# Patient Record
Sex: FEMALE | Race: WHITE | NOT HISPANIC OR LATINO | Employment: FULL TIME | URBAN - METROPOLITAN AREA
[De-identification: names, ages, dates, MRNs, and addresses within clinical notes are randomized per-mention and may not be internally consistent; named-entity substitution may affect disease eponyms.]

---

## 2018-07-21 ENCOUNTER — HOSPITAL ENCOUNTER (EMERGENCY)
Facility: OTHER | Age: 28
Discharge: HOME | End: 2018-07-22
Attending: EMERGENCY MEDICINE
Payer: MEDICAID

## 2018-07-21 DIAGNOSIS — J06.9 UPPER RESPIRATORY TRACT INFECTION, UNSPECIFIED TYPE: ICD-10-CM

## 2018-07-21 DIAGNOSIS — B34.9 VIRAL SYNDROME: Primary | ICD-10-CM

## 2018-07-21 PROCEDURE — 25000242 PHARM REV CODE 250 ALT 637 W/ HCPCS: Performed by: EMERGENCY MEDICINE

## 2018-07-21 PROCEDURE — 94640 AIRWAY INHALATION TREATMENT: CPT

## 2018-07-21 PROCEDURE — 25000003 PHARM REV CODE 250: Performed by: EMERGENCY MEDICINE

## 2018-07-21 PROCEDURE — 99283 EMERGENCY DEPT VISIT LOW MDM: CPT | Mod: 25

## 2018-07-21 RX ORDER — ACETAMINOPHEN 500 MG
1000 TABLET ORAL
Status: COMPLETED | OUTPATIENT
Start: 2018-07-21 | End: 2018-07-21

## 2018-07-21 RX ORDER — IPRATROPIUM BROMIDE AND ALBUTEROL SULFATE 2.5; .5 MG/3ML; MG/3ML
3 SOLUTION RESPIRATORY (INHALATION)
Status: COMPLETED | OUTPATIENT
Start: 2018-07-21 | End: 2018-07-21

## 2018-07-21 RX ADMIN — IPRATROPIUM BROMIDE AND ALBUTEROL SULFATE 3 ML: .5; 3 SOLUTION RESPIRATORY (INHALATION) at 11:07

## 2018-07-21 RX ADMIN — ACETAMINOPHEN 1000 MG: 500 TABLET, FILM COATED ORAL at 10:07

## 2018-07-22 VITALS
OXYGEN SATURATION: 97 % | BODY MASS INDEX: 38.58 KG/M2 | HEIGHT: 64 IN | DIASTOLIC BLOOD PRESSURE: 55 MMHG | HEART RATE: 96 BPM | RESPIRATION RATE: 20 BRPM | TEMPERATURE: 98 F | WEIGHT: 226 LBS | SYSTOLIC BLOOD PRESSURE: 118 MMHG

## 2018-07-22 RX ORDER — ALBUTEROL SULFATE 90 UG/1
1-2 AEROSOL, METERED RESPIRATORY (INHALATION) EVERY 6 HOURS PRN
Qty: 1 INHALER | Refills: 0 | Status: SHIPPED | OUTPATIENT
Start: 2018-07-22 | End: 2019-07-22

## 2018-07-22 NOTE — ED PROVIDER NOTES
"Encounter Date: 7/21/2018    SCRIBE #1 NOTE: I, Jesus Tarango, am scribing for, and in the presence of, Dr. Bowers .       History     Chief Complaint   Patient presents with    Sore Throat     since last night, difficulty breathing in rt lung "I think it is bruised" cough all day with emesis while coughing 35 weeks pregnant sees [facility]       Time seen by provider: 10:16 PM    This is a 27 y.o. female, 35 weeks gestation, with history of asthma and JAVI, who presents with complaint of sudden sore throat x 2 days. She is also endorsing SOB, cough, nausea, vomiting, mild abdominal pain, and right ear pain. She denies fevers, chills, headaches, dizziness, congestion, rhinorrhea, chest pain, diarrhea, or urinary symptoms. She presented to the ED three months ago with similar symptoms and was reportedly diagnosed with an asthma attack. She was given a breathing treatment and discharged. She follows-up with OB-GYN at [facility] and denies any complications with pregnancy. She admits to prior use of tobacco (last used last week).         The history is provided by the patient.     Review of patient's allergies indicates:   Allergen Reactions    Phenergan [promethazine]      Past Medical History:   Diagnosis Date    JAVI (acute kidney injury) 2009    patient unsure of the cause, but was hospitalized for two weeks.    Asthma      History reviewed. No pertinent surgical history.  Family History   Problem Relation Age of Onset    Kidney failure Mother         unilateral nephrectomy for unclear reason    Kidney failure Maternal Uncle         ESRD, h/o DM2, HTN     Social History   Substance Use Topics    Smoking status: Never Smoker    Smokeless tobacco: Not on file    Alcohol use No     Review of Systems   Constitutional: Positive for appetite change. Negative for chills and fever.   HENT: Positive for ear pain (right) and sore throat. Negative for congestion and rhinorrhea.    Respiratory: Positive for " cough and shortness of breath.    Cardiovascular: Negative for chest pain.   Gastrointestinal: Positive for abdominal pain, nausea and vomiting. Negative for diarrhea.   Genitourinary: Negative for decreased urine volume and dysuria.   Musculoskeletal: Negative for back pain.   Skin: Negative for rash.   Neurological: Negative for dizziness and headaches.       Physical Exam     Initial Vitals [07/21/18 2147]   BP Pulse Resp Temp SpO2   112/61 98 16 97.8 °F (36.6 °C) 99 %      MAP       --         Physical Exam    Nursing note and vitals reviewed.  Constitutional: She appears well-developed and well-nourished. No distress.   Coughing on exam.   HENT:   Head: Normocephalic and atraumatic.   Bilateral TM are dull. No bulging or erythema. Tonsils are surgically absent. No posterior pharyngeal swelling or exudates.    Eyes: Conjunctivae and EOM are normal. Pupils are equal, round, and reactive to light.   Neck: Normal range of motion. Neck supple.   No submandibular lymphadenopathy.    Cardiovascular: Normal rate, regular rhythm and normal heart sounds.   Pulmonary/Chest: No respiratory distress. She has wheezes.   Scattered wheezes bilaterally.    Abdominal: Soft. There is no tenderness.   Gravid.   Musculoskeletal: Normal range of motion.   Neurological: She is alert and oriented to person, place, and time. She has normal strength.   Skin: Skin is warm and dry.   Psychiatric: She has a normal mood and affect. Her behavior is normal. Judgment and thought content normal.         ED Course   Procedures  Labs Reviewed - No data to display       Imaging Results    None          Medical Decision Making:   ED Management:  Well-appearing pregnant patient in her 3rd trimester presents with symptoms of a viral URI.  Cough cold congestion earache sore throat. Her primary complaint is her wheezing and cough.  I have discussed cough medicines which she prefers to avoid anything that might be harmful pregnancy.  I do recommend  honey instead.  She does have scattered wheezes. She is a smoker.  She has been treated with albuterol before.  She improved after breathing treatments here.  We discussed x-ray, but with only 1 day of symptoms, and currently afebrile, I would prefer to defer and she is in agreement.  If she worsens she will return at that point we will obtain x-ray.  At this point will treat as a viral URI.  On physical exam her throat there is no signs of tonsillitis or pharyngitis.  No indication for a rapid strep especially with the constellation of other viral type syndrome.  Counseled to follow up closely with primary care and OB gyn and return here if worse.    I did have an extensive talk regarding signs to return for and need for follow up. Patient expressed understanding and will monitor symptoms closely and follow-up as needed.    MAINE Bowers M.D.  07/22/2018  12:28 AM              Scribe Attestation:   Scribe #1: I performed the above scribed service and the documentation accurately describes the services I performed. I attest to the accuracy of the note.    Attending Attestation:           Physician Attestation for Scribe:  Physician Attestation Statement for Scribe #1: I, Dr. Bowers, reviewed documentation, as scribed by Jesus Tarango  in my presence, and it is both accurate and complete.                    Clinical Impression:     1. Viral syndrome    2. Upper respiratory tract infection, unspecified type                                   Rob Bowers MD  07/22/18 0028

## 2018-07-22 NOTE — ED NOTES
Pt states that she feels better after breathing tx. Pt sitting up in bed, respirations even/unlabored. NAD noted. Updated pt on POC. Side rails up x2, call bell within reach. Will continue to monitor.

## 2018-07-22 NOTE — ED TRIAGE NOTES
"Pt arrived to ed w/ c/o worsening cough, sore throat, n/v. Pt states "I have had this cough since 8 am, my right lung hurts way worse, and I haven't been able to keep anything down including water." Pt reports that she is 35 weeks pregnant and is experiencing some abdominal tightness d/t the coughing. Pt also c/o cp to the right side of her chest, sob, HA and dizziness, states that she took tylenol 3 hours ago. Pt denies any fever, chills, diarrhea and dysuria. No acute distress noted upon assessment.  "

## 2022-08-06 ENCOUNTER — OFFICE VISIT (OUTPATIENT)
Dept: URGENT CARE | Facility: CLINIC | Age: 32
End: 2022-08-06
Payer: MEDICAID

## 2022-08-06 VITALS
HEIGHT: 64 IN | OXYGEN SATURATION: 98 % | HEART RATE: 77 BPM | TEMPERATURE: 98 F | RESPIRATION RATE: 18 BRPM | SYSTOLIC BLOOD PRESSURE: 129 MMHG | BODY MASS INDEX: 38.58 KG/M2 | WEIGHT: 226 LBS | DIASTOLIC BLOOD PRESSURE: 77 MMHG

## 2022-08-06 DIAGNOSIS — S99.922A INJURY OF LEFT FOOT, INITIAL ENCOUNTER: Primary | ICD-10-CM

## 2022-08-06 DIAGNOSIS — S93.402A SPRAIN OF LEFT ANKLE, UNSPECIFIED LIGAMENT, INITIAL ENCOUNTER: ICD-10-CM

## 2022-08-06 PROCEDURE — 3008F BODY MASS INDEX DOCD: CPT | Mod: CPTII,S$GLB,, | Performed by: NURSE PRACTITIONER

## 2022-08-06 PROCEDURE — 99203 OFFICE O/P NEW LOW 30 MIN: CPT | Mod: S$GLB,,, | Performed by: NURSE PRACTITIONER

## 2022-08-06 PROCEDURE — 3074F SYST BP LT 130 MM HG: CPT | Mod: CPTII,S$GLB,, | Performed by: NURSE PRACTITIONER

## 2022-08-06 PROCEDURE — 73610 XR ANKLE COMPLETE 3 VIEW LEFT: ICD-10-PCS | Mod: LT,S$GLB,, | Performed by: RADIOLOGY

## 2022-08-06 PROCEDURE — 3078F PR MOST RECENT DIASTOLIC BLOOD PRESSURE < 80 MM HG: ICD-10-PCS | Mod: CPTII,S$GLB,, | Performed by: NURSE PRACTITIONER

## 2022-08-06 PROCEDURE — 1159F MED LIST DOCD IN RCRD: CPT | Mod: CPTII,S$GLB,, | Performed by: NURSE PRACTITIONER

## 2022-08-06 PROCEDURE — 1160F RVW MEDS BY RX/DR IN RCRD: CPT | Mod: CPTII,S$GLB,, | Performed by: NURSE PRACTITIONER

## 2022-08-06 PROCEDURE — 99203 PR OFFICE/OUTPT VISIT, NEW, LEVL III, 30-44 MIN: ICD-10-PCS | Mod: S$GLB,,, | Performed by: NURSE PRACTITIONER

## 2022-08-06 PROCEDURE — 73630 X-RAY EXAM OF FOOT: CPT | Mod: LT,S$GLB,, | Performed by: RADIOLOGY

## 2022-08-06 PROCEDURE — 1159F PR MEDICATION LIST DOCUMENTED IN MEDICAL RECORD: ICD-10-PCS | Mod: CPTII,S$GLB,, | Performed by: NURSE PRACTITIONER

## 2022-08-06 PROCEDURE — 3074F PR MOST RECENT SYSTOLIC BLOOD PRESSURE < 130 MM HG: ICD-10-PCS | Mod: CPTII,S$GLB,, | Performed by: NURSE PRACTITIONER

## 2022-08-06 PROCEDURE — 1160F PR REVIEW ALL MEDS BY PRESCRIBER/CLIN PHARMACIST DOCUMENTED: ICD-10-PCS | Mod: CPTII,S$GLB,, | Performed by: NURSE PRACTITIONER

## 2022-08-06 PROCEDURE — 73610 X-RAY EXAM OF ANKLE: CPT | Mod: LT,S$GLB,, | Performed by: RADIOLOGY

## 2022-08-06 PROCEDURE — 73630 XR FOOT COMPLETE 3 VIEW LEFT: ICD-10-PCS | Mod: LT,S$GLB,, | Performed by: RADIOLOGY

## 2022-08-06 PROCEDURE — 3008F PR BODY MASS INDEX (BMI) DOCUMENTED: ICD-10-PCS | Mod: CPTII,S$GLB,, | Performed by: NURSE PRACTITIONER

## 2022-08-06 PROCEDURE — 3078F DIAST BP <80 MM HG: CPT | Mod: CPTII,S$GLB,, | Performed by: NURSE PRACTITIONER

## 2022-08-06 RX ORDER — METHOCARBAMOL 500 MG/1
500 TABLET, FILM COATED ORAL 3 TIMES DAILY
COMMUNITY
Start: 2022-06-20

## 2022-08-06 RX ORDER — IBUPROFEN 600 MG/1
600 TABLET ORAL EVERY 6 HOURS PRN
Qty: 30 TABLET | Refills: 0 | Status: SHIPPED | OUTPATIENT
Start: 2022-08-06 | End: 2022-08-09

## 2022-08-06 NOTE — PROGRESS NOTES
"Subjective:       Patient ID: Margy Tai is a 31 y.o. female.    Vitals:  height is 5' 4" (1.626 m) and weight is 102.5 kg (226 lb). Her temperature is 98 °F (36.7 °C). Her blood pressure is 129/77 and her pulse is 77. Her respiration is 18 and oxygen saturation is 98%.     Chief Complaint: Foot Pain (Left/) and Foot Injury    31-year-old female presents to clinic for evaluation of left ankle and left foot pain x2 days.  Patient reports injury occurred last night, when she was walking down some stairs, lost her balance, and rolled her left ankle.  She reports difficulty bearing weight to left foot.  She states that she initially elevated her foot and applied ice.  She is not taking any medications.  She denies numbness or tingling.  She is awake and alert, answers questions appropriately, has no other complaints, is otherwise healthy on today's visit.    Foot Injury   The incident occurred 12 to 24 hours ago. The incident occurred at home. The injury mechanism was a fall. The pain is present in the left ankle and left foot. The quality of the pain is described as aching and shooting. The pain is at a severity of 7/10. The pain is moderate. The pain has been constant since onset. Associated symptoms include an inability to bear weight. She reports no foreign bodies present. The symptoms are aggravated by movement and weight bearing. She has tried nothing for the symptoms.       Constitution: Negative. Negative for activity change, appetite change, chills, sweating, fatigue and fever.   HENT: Negative.    Neck: neck negative.   Eyes: Negative.    Respiratory: Negative.    Gastrointestinal: Negative.    Musculoskeletal: Positive for pain, joint pain and joint swelling.   Skin: Negative.  Negative for erythema and bruising.   Neurological: Negative.  Negative for dizziness.       Objective:      Physical Exam   Constitutional: She is oriented to person, place, and time. She appears well-developed.  Non-toxic " appearance. She does not appear ill. No distress.   HENT:   Head: Normocephalic and atraumatic. Head is without abrasion, without contusion and without laceration.   Ears:   Right Ear: External ear normal.   Left Ear: External ear normal.   Mouth/Throat: Oropharynx is clear and moist and mucous membranes are normal.   Eyes: Conjunctivae, EOM and lids are normal. Right eye exhibits no discharge. Left eye exhibits no discharge.   Neck: Trachea normal and phonation normal.   Cardiovascular: Normal rate.   Pulmonary/Chest: Effort normal. No respiratory distress.   Abdominal: Normal appearance.   Musculoskeletal: Normal range of motion.         General: Swelling and tenderness present. No deformity or signs of injury. Normal range of motion.      Left ankle: Tenderness. Lateral malleolus tenderness found.      Comments: Mild tenderness with mild swelling noted over left lateral malleolus.  No obvious deformity noted.  No erythema, or ecchymosis present on exam.   Neurological: She is alert and oriented to person, place, and time.   Skin: Skin is warm, dry, intact, not diaphoretic and no rash. Capillary refill takes less than 2 seconds. No abrasion, No burn, No bruising, No erythema and No ecchymosis   Psychiatric: Her speech is normal and behavior is normal. Mood, judgment and thought content normal.   Nursing note and vitals reviewed.  X-Ray Ankle Complete 3 View Left    Result Date: 8/6/2022  EXAMINATION: XR FOOT COMPLETE 3 VIEW LEFT; XR ANKLE COMPLETE 3 VIEW LEFT CLINICAL HISTORY: .  Unspecified injury of left foot, initial encounter TECHNIQUE: AP, lateral and oblique views of the left foot were performed.  Three views of the ankle were also performed. COMPARISON: None FINDINGS: Left ankle: No fracture or dislocation.  No widening of the ankle mortise.  The joint spaces are maintained.  No soft tissue abnormality. Left foot: No fracture or dislocation.  The joint spaces are maintained.  No soft tissue abnormality.      No fracture or dislocation of the left ankle or left foot. Electronically signed by: Yusra Stack Date:    08/06/2022 Time:    14:19    X-Ray Foot Complete 3 view Left    Result Date: 8/6/2022  EXAMINATION: XR FOOT COMPLETE 3 VIEW LEFT; XR ANKLE COMPLETE 3 VIEW LEFT CLINICAL HISTORY: .  Unspecified injury of left foot, initial encounter TECHNIQUE: AP, lateral and oblique views of the left foot were performed.  Three views of the ankle were also performed. COMPARISON: None FINDINGS: Left ankle: No fracture or dislocation.  No widening of the ankle mortise.  The joint spaces are maintained.  No soft tissue abnormality. Left foot: No fracture or dislocation.  The joint spaces are maintained.  No soft tissue abnormality.     No fracture or dislocation of the left ankle or left foot. Electronically signed by: Yusra Stack Date:    08/06/2022 Time:    14:19        Assessment:       1. Injury of left foot, initial encounter    2. Sprain of left ankle, unspecified ligament, initial encounter          Plan:         Injury of left foot, initial encounter  -     X-Ray Foot Complete 3 view Left; Future; Expected date: 08/06/2022  -     X-Ray Ankle Complete 3 View Left; Future; Expected date: 08/06/2022    Sprain of left ankle, unspecified ligament, initial encounter  -     ibuprofen (ADVIL,MOTRIN) 600 MG tablet; Take 1 tablet (600 mg total) by mouth every 6 (six) hours as needed for Pain.  Dispense: 30 tablet; Refill: 0    - discussed x-rays of left foot and left ankle, no acute fracture dislocation seen.  Discussed symptomatic care for left ankle sprain.  Ace wrap applied in clinic.  Follow-up with PCP, return to clinic as needed.  Patient verbalized understanding and is in agreement with plan.    Patient Instructions   - You must understand that you have received an Urgent Care treatment only and that you may be released before all of your medical problems are known or treated.   - You, the patient, will arrange for  follow up care as instructed.   - If your condition worsens or fails to improve we recommend that you receive another evaluation at the ER immediately or contact your PCP to discuss your concerns or return here.

## 2022-08-06 NOTE — LETTER
August 6, 2022      [facility]  [address] [facility], [address]  [place] [address]  Phone: [phone]  Fax: [phone]       Patient: Margy Tai   YOB: 1990  Date of Visit: 08/06/2022    To Whom It May Concern:    Susan Tai  was at [facility] on 08/06/2022. The patient may return to work/school on 8/8/2022. If you have any questions or concerns, or if I can be of further assistance, please do not hesitate to contact me.    Sincerely,    Reagan Blackburn NP

## 2022-11-22 ENCOUNTER — OFFICE VISIT (OUTPATIENT)
Dept: URGENT CARE | Facility: CLINIC | Age: 32
End: 2022-11-22
Payer: MEDICAID

## 2022-11-22 VITALS
SYSTOLIC BLOOD PRESSURE: 126 MMHG | DIASTOLIC BLOOD PRESSURE: 74 MMHG | OXYGEN SATURATION: 96 % | HEIGHT: 64 IN | BODY MASS INDEX: 38.58 KG/M2 | RESPIRATION RATE: 16 BRPM | HEART RATE: 60 BPM | TEMPERATURE: 98 F | WEIGHT: 226 LBS

## 2022-11-22 DIAGNOSIS — U07.1 LAB TEST POSITIVE FOR DETECTION OF COVID-19 VIRUS: ICD-10-CM

## 2022-11-22 DIAGNOSIS — J06.9 URI, ACUTE: ICD-10-CM

## 2022-11-22 DIAGNOSIS — R05.9 COUGH IN ADULT: ICD-10-CM

## 2022-11-22 DIAGNOSIS — Z11.52 ENCOUNTER FOR SCREENING LABORATORY TESTING FOR COVID-19 VIRUS: Primary | ICD-10-CM

## 2022-11-22 LAB
CTP QC/QA: YES
SARS-COV-2 AG RESP QL IA.RAPID: NEGATIVE

## 2022-11-22 PROCEDURE — 3008F PR BODY MASS INDEX (BMI) DOCUMENTED: ICD-10-PCS | Mod: CPTII,S$GLB,, | Performed by: FAMILY MEDICINE

## 2022-11-22 PROCEDURE — 99213 PR OFFICE/OUTPT VISIT, EST, LEVL III, 20-29 MIN: ICD-10-PCS | Mod: S$GLB,,, | Performed by: FAMILY MEDICINE

## 2022-11-22 PROCEDURE — 1159F PR MEDICATION LIST DOCUMENTED IN MEDICAL RECORD: ICD-10-PCS | Mod: CPTII,S$GLB,, | Performed by: FAMILY MEDICINE

## 2022-11-22 PROCEDURE — 1160F PR REVIEW ALL MEDS BY PRESCRIBER/CLIN PHARMACIST DOCUMENTED: ICD-10-PCS | Mod: CPTII,S$GLB,, | Performed by: FAMILY MEDICINE

## 2022-11-22 PROCEDURE — 1160F RVW MEDS BY RX/DR IN RCRD: CPT | Mod: CPTII,S$GLB,, | Performed by: FAMILY MEDICINE

## 2022-11-22 PROCEDURE — 3078F PR MOST RECENT DIASTOLIC BLOOD PRESSURE < 80 MM HG: ICD-10-PCS | Mod: CPTII,S$GLB,, | Performed by: FAMILY MEDICINE

## 2022-11-22 PROCEDURE — 3008F BODY MASS INDEX DOCD: CPT | Mod: CPTII,S$GLB,, | Performed by: FAMILY MEDICINE

## 2022-11-22 PROCEDURE — 87811 SARS CORONAVIRUS 2 ANTIGEN POCT, MANUAL READ: ICD-10-PCS | Mod: QW,S$GLB,, | Performed by: FAMILY MEDICINE

## 2022-11-22 PROCEDURE — 3078F DIAST BP <80 MM HG: CPT | Mod: CPTII,S$GLB,, | Performed by: FAMILY MEDICINE

## 2022-11-22 PROCEDURE — 3074F PR MOST RECENT SYSTOLIC BLOOD PRESSURE < 130 MM HG: ICD-10-PCS | Mod: CPTII,S$GLB,, | Performed by: FAMILY MEDICINE

## 2022-11-22 PROCEDURE — 87811 SARS-COV-2 COVID19 W/OPTIC: CPT | Mod: QW,S$GLB,, | Performed by: FAMILY MEDICINE

## 2022-11-22 PROCEDURE — 99213 OFFICE O/P EST LOW 20 MIN: CPT | Mod: S$GLB,,, | Performed by: FAMILY MEDICINE

## 2022-11-22 PROCEDURE — 3074F SYST BP LT 130 MM HG: CPT | Mod: CPTII,S$GLB,, | Performed by: FAMILY MEDICINE

## 2022-11-22 PROCEDURE — 1159F MED LIST DOCD IN RCRD: CPT | Mod: CPTII,S$GLB,, | Performed by: FAMILY MEDICINE

## 2022-11-22 RX ORDER — LORATADINE 10 MG/1
10 TABLET ORAL DAILY
Qty: 30 TABLET | Refills: 2 | Status: SHIPPED | OUTPATIENT
Start: 2022-11-22 | End: 2023-11-22

## 2022-11-22 RX ORDER — IBUPROFEN 600 MG/1
TABLET ORAL
COMMUNITY
Start: 2022-08-25

## 2022-11-22 RX ORDER — BENZONATATE 100 MG/1
200 CAPSULE ORAL 3 TIMES DAILY PRN
Qty: 30 CAPSULE | Refills: 1 | Status: SHIPPED | OUTPATIENT
Start: 2022-11-22

## 2022-11-22 RX ORDER — PROPRANOLOL HYDROCHLORIDE 10 MG/1
TABLET ORAL
COMMUNITY
Start: 2022-08-25

## 2022-11-22 RX ORDER — ALBUTEROL SULFATE 90 UG/1
2 AEROSOL, METERED RESPIRATORY (INHALATION) EVERY 6 HOURS PRN
Qty: 18 G | Refills: 3 | Status: SHIPPED | OUTPATIENT
Start: 2022-11-22

## 2022-11-22 RX ORDER — RISPERIDONE 0.5 MG/1
TABLET ORAL
COMMUNITY
Start: 2022-08-25

## 2022-11-22 RX ORDER — OXCARBAZEPINE 600 MG/1
1200 TABLET, FILM COATED ORAL 2 TIMES DAILY
COMMUNITY
Start: 2022-11-10

## 2022-11-22 NOTE — LETTER
November 22, 2022      [facility]  [address] [place] [address]  Phone: [phone]  Fax: [phone]       Patient: Margy Tai   YOB: 1990  Date of Visit: 11/22/2022    To Whom It May Concern:    Susan Tai  was at [facility] on 11/22/2022. The patient may return to work/school on 11/26/22   with no restrictions. If you have any questions or concerns, or if I can be of further assistance, please do not hesitate to contact me.    Sincerely,    Eva Krueger MD

## 2022-11-22 NOTE — PATIENT INSTRUCTIONS
Please drink plenty of fluids.  Please get plenty of rest.  Please return here or go to the Emergency Department for any concerns or worsening of condition.  If you were prescribed antibiotics, please take them to completion.  If you were given wait & see antibiotics, please wait 5-7 days before taking them, and only take them if your symptoms have worsened or not improved.  If you do begin taking the antibiotics, please take them to completion.  If you were prescribed a narcotic medication, do not drive or operate heavy equipment or machinery while taking these medications.  If you were given a steroid shot in the clinic and have also been given a prescription for a steroid such as Prednisone or a Medrol Dose Pack, please begin taking them tomorrow.  If you do not have Hypertension or any history of palpitations, it is ok to take over the counter Sudafed or Mucinex D or Allegra-D or Claritin-D or Zyrtec-D.  If you do take one of the above, it is ok to combine that with plain over the counter Mucinex or Allegra or Claritin or Zyrtec.  If for example you are taking Zyrtec -D, you can combine that with Mucinex, but not Mucinex-D.  If you are taking Mucinex-D, you can combine that with plain Allegra or Claritin or Zyrtec.   If you do have Hypertension or palpitations, it is safe to take Coricidin HBP for relief of sinus symptoms.  If not allergic, please take over the counter Tylenol (Acetaminophen) and/or Motrin (Ibuprofen) as directed for control of pain and/or fever.  Please follow up with your primary care doctor or specialist as needed.    If you  smoke, please stop smoking.

## 2022-11-22 NOTE — PROGRESS NOTES
"Subjective:       Patient ID: Margy Tai is a 31 y.o. female.    Vitals:  height is 5' 4" (1.626 m) and weight is 102.5 kg (226 lb). Her oral temperature is 97.9 °F (36.6 °C). Her blood pressure is 126/74 and her pulse is 60. Her respiration is 16 and oxygen saturation is 96%.     Chief Complaint: Cough    C/o cough and some chest congestion as well as low grade  fever x 2 days, exposed to 5 y/o daughter with covid  Pt is vaccinated and boosted      Cough  This is a new problem. The current episode started in the past 7 days (2 days ago). The problem has been unchanged. The problem occurs every few minutes. The cough is Non-productive. Associated symptoms include chest pain, ear pain, headaches, postnasal drip, a sore throat and shortness of breath. Pertinent negatives include no ear congestion, fever or nasal congestion. Treatments tried: ibuprofen. The treatment provided mild relief. Her past medical history is significant for asthma and bronchitis. There is no history of pneumonia.     Constitution: Negative for fever.   HENT:  Positive for ear pain, postnasal drip and sore throat.    Cardiovascular:  Positive for chest pain.   Respiratory:  Positive for cough and shortness of breath.    Neurological:  Positive for headaches.     Objective:      Physical Exam   Constitutional: She is oriented to person, place, and time. She appears well-developed. She is cooperative.  Non-toxic appearance. She does not appear ill. No distress.   HENT:   Head: Normocephalic and atraumatic.   Ears:   Right Ear: Hearing, tympanic membrane, external ear and ear canal normal.   Left Ear: Hearing, tympanic membrane, external ear and ear canal normal.   Nose: Nose normal. No mucosal edema, rhinorrhea or nasal deformity. No epistaxis. Right sinus exhibits no maxillary sinus tenderness and no frontal sinus tenderness. Left sinus exhibits no maxillary sinus tenderness and no frontal sinus tenderness.   Mouth/Throat: Uvula is midline, " oropharynx is clear and moist and mucous membranes are normal. Mucous membranes are moist. No trismus in the jaw. Normal dentition. No uvula swelling. No oropharyngeal exudate. Oropharynx is clear.   Eyes: Conjunctivae and lids are normal. Pupils are equal, round, and reactive to light. Right eye exhibits no discharge. Left eye exhibits no discharge. No scleral icterus. Extraocular movement intact   Neck: Trachea normal and phonation normal. Neck supple.   Cardiovascular: Normal rate, regular rhythm, normal heart sounds and normal pulses.   Pulmonary/Chest: Effort normal and breath sounds normal. No respiratory distress.   Abdominal: Normal appearance and bowel sounds are normal. She exhibits no distension and no mass. Soft. There is no abdominal tenderness.   Musculoskeletal: Normal range of motion.         General: No deformity. Normal range of motion.   Neurological: She is alert and oriented to person, place, and time. She exhibits normal muscle tone. Coordination normal.   Skin: Skin is warm, dry, intact, not diaphoretic and not pale.   Psychiatric: Her speech is normal and behavior is normal. Judgment and thought content normal.   Nursing note and vitals reviewed.      Assessment:       1. Encounter for screening laboratory testing for COVID-19 virus    2. Cough in adult    3. URI, acute    4. Lab test positive for detection of COVID-19 virus            Plan:         Encounter for screening laboratory testing for COVID-19 virus  -     SARS Coronavirus 2 Antigen, POCT Manual Read    Cough in adult    URI, acute    Lab test positive for detection of COVID-19 virus    Other orders  -     benzonatate (TESSALON PERLES) 100 MG capsule; Take 2 capsules (200 mg total) by mouth 3 (three) times daily as needed for Cough.  Dispense: 30 capsule; Refill: 1  -     albuterol (VENTOLIN HFA) 90 mcg/actuation inhaler; Inhale 2 puffs into the lungs every 6 (six) hours as needed for Wheezing. Rescue  Dispense: 18 g; Refill: 3  -      loratadine (CLARITIN) 10 mg tablet; Take 1 tablet (10 mg total) by mouth once daily.  Dispense: 30 tablet; Refill: 2             Results for orders placed or performed in visit on 11/22/22   SARS Coronavirus 2 Antigen, POCT Manual Read   Result Value Ref Range    SARS Coronavirus 2 Antigen Negative Negative     Acceptable Yes            Did home covid test at home was positive  Since exposed to child with positive copvid  will get her out of work x 5 days

## 2024-06-28 ENCOUNTER — CLINICAL SUPPORT (OUTPATIENT)
Dept: OBSTETRICS AND GYNECOLOGY | Facility: CLINIC | Age: 34
End: 2024-06-28
Payer: MEDICAID

## 2024-06-28 ENCOUNTER — PATIENT MESSAGE (OUTPATIENT)
Dept: OBSTETRICS AND GYNECOLOGY | Facility: CLINIC | Age: 34
End: 2024-06-28

## 2024-06-28 DIAGNOSIS — N91.2 AMENORRHEA: Primary | ICD-10-CM

## 2024-06-28 PROCEDURE — 99999 PR PBB SHADOW E&M-EST. PATIENT-LVL II: CPT | Mod: PBBFAC,,,

## 2024-06-28 PROCEDURE — 99212 OFFICE O/P EST SF 10 MIN: CPT | Mod: PBBFAC

## 2024-06-28 NOTE — PROGRESS NOTES
Spoke with patient for a total of 45 minutes during 2 separate phone calls due to unable to log into virtual visit.  Updated chart to reflect up to date patient demographics.  Allergies, medications, pharmacy, medical/family history and OB history updated.  Patient was guided through expectations of care during pregnancy.  Pregnancy confirmation, dating u/s & first routine OB appts scheduled.  Education provided & questions answered. Encouraged to send message or call office with any questions/concerns. Verbalized understanding.     Discussed with pt:    taking PNV   C/o n/v  denies cramping/spotting  Precautions discussed  Referred to [email or web address] for Preg A to Z guide & class schedule   Discussed benefits of breastfeeding - plans to breastfeed (unsucc with others-discussed)  Discussed need for pediatrician  Had appt at [facility]  New pt-requested 1st avail at [facility]  Reports sensitive to meds-had rxn to vit B12 supp several yrs ago-no prob with PNV

## 2024-07-03 LAB
ABO AND RH: NORMAL
ANTIBODY SCREEN: NORMAL
C TRACH RRNA SPEC QL PROBE: NORMAL
FINAL PATHOLOGIC DIAGNOSIS: NORMAL
GLUCOSE SERPL-MCNC: 114 MG/DL
HBV SURFACE AG SERPL QL IA: NEGATIVE
HCT VFR BLD AUTO: 42.4 %
HCV AB SERPL QL IA: NORMAL
HGB BLD-MCNC: 14.4 G/DL
HIV 1+2 AB+HIV1 P24 AG SERPL QL IA: NORMAL
N GONORRHOEAE, AMPLIFIED DNA: NORMAL
PLATELET # BLD AUTO: 229 K/UL (ref 150–399)
RPR: NORMAL
RUBELLA IMMUNE STATUS: NORMAL
URINE CULTURE, ROUTINE: NORMAL

## 2024-07-19 ENCOUNTER — HOSPITAL ENCOUNTER (OUTPATIENT)
Dept: PERINATAL CARE | Facility: OTHER | Age: 34
Discharge: HOME | End: 2024-07-19
Attending: FAMILY MEDICINE
Payer: MEDICAID

## 2024-07-19 ENCOUNTER — ROUTINE PRENATAL (OUTPATIENT)
Dept: OBSTETRICS AND GYNECOLOGY | Facility: CLINIC | Age: 34
End: 2024-07-19
Payer: MEDICAID

## 2024-07-19 VITALS
BODY MASS INDEX: 35.8 KG/M2 | WEIGHT: 208.56 LBS | DIASTOLIC BLOOD PRESSURE: 84 MMHG | HEART RATE: 64 BPM | SYSTOLIC BLOOD PRESSURE: 111 MMHG

## 2024-07-19 DIAGNOSIS — Z34.80 SUPERVISION OF OTHER NORMAL PREGNANCY, ANTEPARTUM: ICD-10-CM

## 2024-07-19 DIAGNOSIS — N91.2 AMENORRHEA: ICD-10-CM

## 2024-07-19 DIAGNOSIS — O21.9 NAUSEA AND VOMITING IN PREGNANCY: Primary | ICD-10-CM

## 2024-07-19 DIAGNOSIS — F31.70 BIPOLAR AFFECTIVE DISORDER IN REMISSION: ICD-10-CM

## 2024-07-19 DIAGNOSIS — Z87.09 HX OF STATUS ASTHMATICUS: ICD-10-CM

## 2024-07-19 PROBLEM — F31.9 BIPOLAR DISORDER: Status: ACTIVE | Noted: 2024-07-19

## 2024-07-19 PROCEDURE — 99203 OFFICE O/P NEW LOW 30 MIN: CPT | Mod: TH,S$PBB,UC, | Performed by: ADVANCED PRACTICE MIDWIFE

## 2024-07-19 PROCEDURE — 99213 OFFICE O/P EST LOW 20 MIN: CPT | Mod: PBBFAC,TH | Performed by: ADVANCED PRACTICE MIDWIFE

## 2024-07-19 PROCEDURE — 99999 PR PBB SHADOW E&M-EST. PATIENT-LVL III: CPT | Mod: PBBFAC,,, | Performed by: ADVANCED PRACTICE MIDWIFE

## 2024-07-19 PROCEDURE — 76801 OB US < 14 WKS SINGLE FETUS: CPT | Mod: 26,,, | Performed by: OBSTETRICS & GYNECOLOGY

## 2024-07-19 RX ORDER — ONDANSETRON 8 MG/1
8 TABLET, ORALLY DISINTEGRATING ORAL EVERY 12 HOURS PRN
Qty: 30 TABLET | Refills: 1 | Status: SHIPPED | OUTPATIENT
Start: 2024-07-19

## 2024-07-19 NOTE — PROGRESS NOTES
HISTORY OF PRESENT ILLNESS:    Margy Tai is a 33 y.o. female, ,  No LMP recorded. Patient is pregnant.   Initial prenatal care thru [place] [facility]- records reviewed and scanned. Pt reports hx bipolar disorder- stopped her meds with pregnancy and reports feeling well- has appt with her psychiatrist and will discuss if needs to resume meds. Pt reports hx asthma but has not needed an inhaler in 2 years. Pt has strong family hx of diabetes-  pt reports had early 1hr glucola results not in records- will attempt to obtain. Pap- 2024- [place]    Past Medical History:   Diagnosis Date    JAVI (acute kidney injury)     patient unsure of the cause, but was hospitalized for two weeks.    Asthma     Mental disorder        History reviewed. No pertinent surgical history.    MEDICATIONS AND ALLERGIES:      Current Outpatient Medications:     albuterol (VENTOLIN HFA) 90 mcg/actuation inhaler, Inhale 2 puffs into the lungs every 6 (six) hours as needed for Wheezing. Rescue (Patient not taking: Reported on 2024), Disp: 18 g, Rfl: 3    benzonatate (TESSALON PERLES) 100 MG capsule, Take 2 capsules (200 mg total) by mouth 3 (three) times daily as needed for Cough. (Patient not taking: Reported on 2024), Disp: 30 capsule, Rfl: 1    hydrocodone-acetaminophen 5-325mg (NORCO) 5-325 mg per tablet, Take 1 tablet by mouth every 6 (six) hours as needed (severe pain not relieved by over the counter medicine.). (Patient not taking: Reported on 2022), Disp: 15 tablet, Rfl: 0    loratadine (CLARITIN) 10 mg tablet, Take 1 tablet (10 mg total) by mouth once daily., Disp: 30 tablet, Rfl: 2    methocarbamoL (ROBAXIN) 500 MG Tab, Take 500 mg by mouth 3 (three) times daily. (Patient not taking: Reported on 2024), Disp: , Rfl:     ondansetron (ZOFRAN-ODT) 8 MG TbDL, Take 1 tablet (8 mg total) by mouth every 12 (twelve) hours as needed (nausea and vomiting)., Disp: 30 tablet, Rfl: 1    OXcarbazepine (TRILEPTAL) 600  MG Tab, Take 1,200 mg by mouth 2 (two) times daily. (Patient not taking: Reported on 2024), Disp: , Rfl:     prenatal vit no.124/iron/folic (PRENATAL VITAMIN ORAL), Take 1 Dose by mouth Daily. (Patient not taking: Reported on 2024), Disp: , Rfl:     risperiDONE (RISPERDAL) 0.5 MG Tab, risperiDONE 0.5 MG Oral Tablet QTY: 0 tablet Days: 0 Refills: 0  Written: 22 Patient Instructions: (Patient not taking: Reported on 2024), Disp: , Rfl:     Review of patient's allergies indicates:   Allergen Reactions    Cyanocobalamin (vitamin b12) Other (See Comments)     Stated that she was foaming at the mouth with muscle tightness     Lamictal [lamotrigine] Hives    Promethazine Hives       Family History   Problem Relation Name Age of Onset    Kidney failure Mother          unilateral nephrectomy for unclear reason    Diabetes Sister      Diabetes Maternal Grandmother      Diabetes Maternal Uncle      Kidney failure Maternal Uncle          ESRD, h/o DM2, HTN    Diabetes Maternal Aunt         Social History     Socioeconomic History    Marital status: Significant Other   Tobacco Use    Smoking status: Former     Current packs/day: 0.00     Types: Cigarettes     Quit date: 2024     Years since quittin.0    Smokeless tobacco: Never   Substance and Sexual Activity    Alcohol use: No    Drug use: Not Currently     Types: Marijuana     Comment: last used 24    Sexual activity: Yes     Partners: Male       COMPREHENSIVE GYN HISTORY:  PAP History: Denies abnormal Paps.  Infection History: Denies STDs. Denies PID.  Benign History: Denies uterine fibroids. Denies ovarian cysts. Denies endometriosis. Denies other conditions.  Cancer History: Denies cervical cancer. Denies uterine cancer or hyperplasia. Denies ovarian cancer. Denies vulvar cancer or pre-cancer. Denies vaginal cancer or pre-cancer. Denies breast cancer. Denies colon cancer.  Sexual Activity History: Reports currently being sexually  active  Menstrual History: None.  Contraception: None    ROS:  GENERAL: No weight changes. No swelling. No fatigue. No fever.  CARDIOVASCULAR: No chest pain. No shortness of breath. No leg cramps.   NEUROLOGICAL: No headaches. No vision changes.  BREASTS: No pain. No lumps. No discharge.  ABDOMEN: No pain. No nausea. No vomiting. No diarrhea. No constipation.  REPRODUCTIVE: No abnormal bleeding.   VULVA: No pain. No lesions. No itching.  VAGINA: No relaxation. No itching. No odor. No discharge. No lesions.  URINARY: No incontinence. No nocturia. No frequency. No dysuria.    /84   Pulse 64   Wt 94.6 kg (208 lb 8.9 oz)   BMI 35.80 kg/m²     PE:  AFFECT: Alert and oriented X 3. Interactive during exam  GENERAL: Appearance well-nourished, well-developed, in no acute distress.  HEENT: WNL  TEETH: Good dentition.  LUNGS: Easy and unlabored  HEART: Regular rate and rhythm     PROCEDURES:  UPT Positive    DIAGNOSIS:  Gyn exam  IUP with stated LMP of 05/12/24    PLAN:Routine prenatal care    MEDICATIONS PRESCRIBED:Zofran 4mg PRN N/V    LABS AND TESTS ORDERED:        NEW PREGNANCY COUNSELING  Patient was counseled today on:  - Routine prenatal blood tests including HIV and anticipated course of prenatal care  - Prenatal vitamins and folic acid  - Weight gain, nutrition, and exercise  - Seafood and mercury  - Properly heating deli and prepared meats and avoiding unrefrigerated deli  meats, cheeses, and milk products,   - Avoiding cat litter and raw meats due to risk of Toxoplasmosis precautions   - Accuracy of the LMP-based ROSARIO and the value of an early TV-u/s  - Aneuploidy and neural tube screening -- cffDNA, sequential screening, and AFP screen at 15 weeks  - OTC medication in the first trimester  - Harmful effects of smoking, etOH, and recreational drugs  - MFM u/s  at 18-20 weeks.  - Common complaints of pregnancy  - Seat belt use  - Childbirth classes and hospital facilities  - All questions were  answered    TERATOLOGY COUNSELING:   Discussed indications and options for aneuploidy screening - pamphlets given    - Pain and bleeding precautions given    - Return to clinic in 4 weeks  -Encouraged to keep scheduled appointment with her psychiatrist to discuss needs for meds    Yareli Nava CNM    OB/GYN    Total time of 30 minutes, including face-to-face time and non-face-to-face time preparing to see the patient (eg, review of tests), obtaining and/or reviewing separately obtained history, documenting clinical information in the electronic or other health record, independently interpreting results, communicating results to the patient/family/caregiver, or care coordination.

## 2024-07-26 ENCOUNTER — TELEPHONE (OUTPATIENT)
Dept: OBSTETRICS AND GYNECOLOGY | Facility: CLINIC | Age: 34
End: 2024-07-26
Payer: MEDICAID

## 2024-07-26 NOTE — TELEPHONE ENCOUNTER
----- Message from Gely Brewer sent at 7/26/2024  9:34 AM CDT -----  Type:  Patient Returning Call    Who Called:pt   Who Left Message for Patient:  Does the patient know what this is regarding?: US order   Would the patient rather a call back or a response via [employer]? Call   Best Call Back Number:[phone]  Additional Information: pt states she should have US orders put in and would like office to order

## 2024-07-26 NOTE — TELEPHONE ENCOUNTER
Returned pt's call- no answer, left VM. Advised pt next US will be marc scan done btwn 18-20 weeks and will scheduled that appt closer to that gestational age.Advised her to call back if any questions or concerns.

## 2024-08-20 ENCOUNTER — INITIAL PRENATAL (OUTPATIENT)
Dept: OBSTETRICS AND GYNECOLOGY | Facility: CLINIC | Age: 34
End: 2024-08-20
Payer: MEDICAID

## 2024-08-20 ENCOUNTER — LAB VISIT (OUTPATIENT)
Dept: LAB | Facility: HOSPITAL | Age: 34
End: 2024-08-20
Attending: OBSTETRICS & GYNECOLOGY
Payer: MEDICAID

## 2024-08-20 VITALS — BODY MASS INDEX: 38.2 KG/M2 | SYSTOLIC BLOOD PRESSURE: 98 MMHG | DIASTOLIC BLOOD PRESSURE: 62 MMHG | WEIGHT: 222.56 LBS

## 2024-08-20 DIAGNOSIS — Z3A.14 14 WEEKS GESTATION OF PREGNANCY: ICD-10-CM

## 2024-08-20 DIAGNOSIS — F31.70 BIPOLAR AFFECTIVE DISORDER IN REMISSION: ICD-10-CM

## 2024-08-20 DIAGNOSIS — O21.9 NAUSEA AND VOMITING IN PREGNANCY: ICD-10-CM

## 2024-08-20 DIAGNOSIS — O09.92 SUPERVISION OF HIGH RISK PREGNANCY IN SECOND TRIMESTER: ICD-10-CM

## 2024-08-20 DIAGNOSIS — O09.92 SUPERVISION OF HIGH RISK PREGNANCY IN SECOND TRIMESTER: Primary | ICD-10-CM

## 2024-08-20 DIAGNOSIS — Z36.89 ENCOUNTER FOR FETAL ANATOMIC SURVEY: ICD-10-CM

## 2024-08-20 PROCEDURE — 99999 PR PBB SHADOW E&M-EST. PATIENT-LVL III: CPT | Mod: PBBFAC,,, | Performed by: OBSTETRICS & GYNECOLOGY

## 2024-08-20 PROCEDURE — 86900 BLOOD TYPING SEROLOGIC ABO: CPT | Performed by: OBSTETRICS & GYNECOLOGY

## 2024-08-20 PROCEDURE — 99213 OFFICE O/P EST LOW 20 MIN: CPT | Mod: TH,S$PBB,, | Performed by: OBSTETRICS & GYNECOLOGY

## 2024-08-20 PROCEDURE — 99213 OFFICE O/P EST LOW 20 MIN: CPT | Mod: PBBFAC,25,TH,PN | Performed by: OBSTETRICS & GYNECOLOGY

## 2024-08-20 PROCEDURE — 86850 RBC ANTIBODY SCREEN: CPT | Performed by: OBSTETRICS & GYNECOLOGY

## 2024-08-20 PROCEDURE — 36415 COLL VENOUS BLD VENIPUNCTURE: CPT | Mod: PN | Performed by: OBSTETRICS & GYNECOLOGY

## 2024-08-20 RX ORDER — ONDANSETRON 8 MG/1
8 TABLET, ORALLY DISINTEGRATING ORAL EVERY 6 HOURS PRN
Qty: 30 TABLET | Refills: 1 | Status: SHIPPED | OUTPATIENT
Start: 2024-08-20

## 2024-08-20 NOTE — PROGRESS NOTES
Initial OB visit. Doing well.   Reports bipolar symptoms stable off medications.     BP 98/62   Wt 101 kg (222 lb 8.9 oz)   BMI 38.20 kg/m²     33 y.o., at 14w4d by Estimated Date of Delivery: 25  Patient Active Problem List   Diagnosis    Pyelonephritis    Bipolar affective disorder in remission    Hx of status asthmaticus     OB History    Para Term  AB Living   3 2 2     2   SAB IAB Ectopic Multiple Live Births           2      # Outcome Date GA Lbr Murphy/2nd Weight Sex Type Anes PTL Lv   3 Current            2 Term 18 40w1d  3.289 kg (7 lb 4 oz) F Vag-Spont   CRAIG   1 Term 17 39w0d  2.41 kg (5 lb 5 oz) M Vag-Spont   CRAIG       Dating reviewed    Allergies and problem list reviewed and updated    Medical and surgical history reviewed    Prenatal labs reviewed and updated    Physical Exam:  ABD: soft, gravid, nontender    Assessment:  Margy was seen today for initial prenatal visit.    Diagnoses and all orders for this visit:    Supervision of high risk pregnancy in second trimester  -     ondansetron (ZOFRAN-ODT) 8 MG TbDL; Take 1 tablet (8 mg total) by mouth every 6 (six) hours as needed (nausea and vomiting).  -     Type & Screen; Future  -     Connected MOM Enrollment  -     Assign Connected MOM Program Consent Questionnaire  -     VARICELLA ZOSTER ANTIBODY, IGG; Future    14 weeks gestation of pregnancy    Nausea and vomiting in pregnancy  -     ondansetron (ZOFRAN-ODT) 8 MG TbDL; Take 1 tablet (8 mg total) by mouth every 6 (six) hours as needed (nausea and vomiting).    Encounter for fetal anatomic survey  -     US MFM Procedure (Viewpoint); Future    Bipolar affective disorder in remission        Orders Placed This Encounter   Procedures    Urine culture    CBC Without Differential    Antibody Screen    C. trachomatis/N. gonorrhoeae by AMP DNA    Hepatitis C Antibody    Hepatitis B Surface Antigen    HIV 1/2 Ag/Ab (4th Gen)    RPR    Rubella Antibody, IgG    Glucose, Random     VARICELLA ZOSTER ANTIBODY, IGG    Connected MOM Enrollment    Type & Screen    Type & Screen    US MFM Procedure (Viewpoint)       Reviewed labs.  Bipolar - stable, no meds, patient has regular follow up with her Psychiatrist.   Asthma - no meds currently  [id number] done today.  Connected mom ordered.   Anatomy ordered.  Follow up appts scheduled.    Follow up in about 4 weeks (around 9/17/2024) for routine OB.

## 2024-08-21 ENCOUNTER — PATIENT MESSAGE (OUTPATIENT)
Dept: MATERNAL FETAL MEDICINE | Facility: CLINIC | Age: 34
End: 2024-08-21
Payer: MEDICAID

## 2024-08-21 LAB
ABO + RH BLD: NORMAL
BLD GP AB SCN CELLS X3 SERPL QL: NORMAL
SPECIMEN OUTDATE: NORMAL

## 2024-08-29 ENCOUNTER — TELEPHONE (OUTPATIENT)
Dept: OBSTETRICS AND GYNECOLOGY | Facility: CLINIC | Age: 34
End: 2024-08-29
Payer: MEDICAID

## 2024-08-29 NOTE — TELEPHONE ENCOUNTER
----- Message from [facility] Wesley sent at 8/29/2024  9:27 AM CDT -----  Contact: [phone]@[email or web address]  Good morning patient would like a call back  to discuss what med she take for a cold. Please call patient to advise  [phone]

## 2024-08-30 ENCOUNTER — TELEPHONE (OUTPATIENT)
Dept: OBSTETRICS AND GYNECOLOGY | Facility: CLINIC | Age: 34
End: 2024-08-30
Payer: MEDICAID

## 2024-08-30 NOTE — TELEPHONE ENCOUNTER
Called pt with MT21 results. Adv negative. Pt request gender be placed in envelope. Will  today. 08/30/24.

## 2024-09-01 ENCOUNTER — E-VISIT (OUTPATIENT)
Dept: OBSTETRICS AND GYNECOLOGY | Facility: CLINIC | Age: 34
End: 2024-09-01
Payer: MEDICAID

## 2024-09-01 DIAGNOSIS — J45.909 ASTHMA, UNSPECIFIED ASTHMA SEVERITY, UNSPECIFIED WHETHER COMPLICATED, UNSPECIFIED WHETHER PERSISTENT: Primary | ICD-10-CM

## 2024-09-02 ENCOUNTER — PATIENT MESSAGE (OUTPATIENT)
Dept: OTHER | Facility: OTHER | Age: 34
End: 2024-09-02
Payer: MEDICAID

## 2024-09-03 RX ORDER — ALBUTEROL SULFATE 90 UG/1
2 INHALANT RESPIRATORY (INHALATION) EVERY 6 HOURS PRN
Qty: 18 G | Refills: 3 | Status: SHIPPED | OUTPATIENT
Start: 2024-09-03

## 2024-09-03 NOTE — PROGRESS NOTES
Patient ID: Margy Tai is a 33 y.o. female.    Chief Complaint: General Illness (Entered automatically based on patient selection in [employer].)    The patient initiated a request through [employer] on 9/1/2024 for evaluation and management with a chief complaint of General Illness (Entered automatically based on patient selection in [employer].)     I evaluated the questionnaire submission on 9/3/24.    [facility] P[facility]    9/1/2024  6:28 PM CDT - Filed by Patient   What do you need help with? Other Concern   Do you agree to participate in an E-Visit? Yes   If you have any of the following symptoms, please present to your local emergency room or call 911:  I acknowledge   Are you pregnant, could you be pregnant, or are you breast feeding? Pregnant   Do you have any of the following symptoms? Yes   This concern is not appropriate for an E-Visit    You can also call your Labor & Delivery Unit to reach the OB/GYN after hours and on weekends.    What is the main issue you would like addressed today? I am having a little trouble breathing from a lingering cold i have asthma   Please describe your symptoms Shortness of breath lightheaded after coughing   Where is your problem located? Chest   How severe are your symptoms? Moderate   Have you had these symptoms before? Yes   How long have you been having these symptoms? For a few days   Please list any medications or treatments you have used for your condition and indicate if it was effective or not. Zofran   What makes this feel better? Nausea   What makes this feel worse? Na   Are these symptoms related to a condition that you currently have? No   Please describe any probable cause for these symptoms None   Provide any additional information you feel is important.    Please attach any relevant images or files    Are you able to take your vital signs? No         Encounter Diagnosis   Name Primary?    Asthma, unspecified asthma severity, unspecified whether  complicated, unspecified whether persistent Yes        No orders of the defined types were placed in this encounter.     Medications Ordered This Encounter   Medications    albuterol (VENTOLIN HFA) 90 mcg/actuation inhaler     Sig: Inhale 2 puffs into the lungs every 6 (six) hours as needed for Wheezing. Rescue     Dispense:  18 g     Refill:  3        No follow-ups on file.    Recommended referral to Pulmonology if persistent symptoms during pregnancy.     E-Visit Time Tracking:    Day 1 Time (in minutes): 5    Total Time (in minutes): 5

## 2024-09-09 ENCOUNTER — PATIENT MESSAGE (OUTPATIENT)
Dept: OTHER | Facility: OTHER | Age: 34
End: 2024-09-09
Payer: MEDICAID

## 2024-09-17 ENCOUNTER — ROUTINE PRENATAL (OUTPATIENT)
Dept: OBSTETRICS AND GYNECOLOGY | Facility: CLINIC | Age: 34
End: 2024-09-17
Payer: MEDICAID

## 2024-09-17 VITALS
HEART RATE: 73 BPM | WEIGHT: 229.38 LBS | DIASTOLIC BLOOD PRESSURE: 64 MMHG | BODY MASS INDEX: 39.37 KG/M2 | SYSTOLIC BLOOD PRESSURE: 94 MMHG

## 2024-09-17 DIAGNOSIS — F31.70 BIPOLAR AFFECTIVE DISORDER IN REMISSION: ICD-10-CM

## 2024-09-17 DIAGNOSIS — O99.512 ASTHMA AFFECTING PREGNANCY IN SECOND TRIMESTER: ICD-10-CM

## 2024-09-17 DIAGNOSIS — J45.909 ASTHMA AFFECTING PREGNANCY IN SECOND TRIMESTER: ICD-10-CM

## 2024-09-17 DIAGNOSIS — O09.892 SUPERVISION OF OTHER HIGH RISK PREGNANCIES, SECOND TRIMESTER: Primary | ICD-10-CM

## 2024-09-17 DIAGNOSIS — K59.00 CONSTIPATION, UNSPECIFIED CONSTIPATION TYPE: ICD-10-CM

## 2024-09-17 DIAGNOSIS — Z3A.18 18 WEEKS GESTATION OF PREGNANCY: ICD-10-CM

## 2024-09-17 PROCEDURE — 99213 OFFICE O/P EST LOW 20 MIN: CPT | Mod: PBBFAC,TH,PN | Performed by: OBSTETRICS & GYNECOLOGY

## 2024-09-17 PROCEDURE — 99213 OFFICE O/P EST LOW 20 MIN: CPT | Mod: TH,S$PBB,, | Performed by: OBSTETRICS & GYNECOLOGY

## 2024-09-17 PROCEDURE — 99999 PR PBB SHADOW E&M-EST. PATIENT-LVL III: CPT | Mod: PBBFAC,,, | Performed by: OBSTETRICS & GYNECOLOGY

## 2024-09-17 NOTE — PATIENT INSTRUCTIONS
Constipation:  Take 1 fiber pill/day x 3 days.  Then 2 pills/day x 3 days.  Then 3 pills/day x 3 days...increasing in this fashion to max 6 pills a day.  STOP when you find dose that makes stool easy to pass (this may be 1 pill a day or may be 4 pills/day). Miralax daily. Colace or Senna 1-2 times daily.

## 2024-09-17 NOTE — PROGRESS NOTES
Constipation - taking miralax daily, fiber 2 scoops every other day with continued constipation.   Asthma - worsening symptoms with an URI, Rx for albuterol helped and symptoms are resolved.    BP 94/64   Pulse 73   Wt 104.1 kg (229 lb 6.2 oz)   BMI 39.37 kg/m²     33 y.o., at 18w1d by Estimated Date of Delivery: 25  Patient Active Problem List   Diagnosis    Pyelonephritis    Bipolar affective disorder in remission    Hx of status asthmaticus     OB History    Para Term  AB Living   3 2 2     2   SAB IAB Ectopic Multiple Live Births           2      # Outcome Date GA Lbr Murphy/2nd Weight Sex Type Anes PTL Lv   3 Current            2 Term 18 40w1d  3.289 kg (7 lb 4 oz) F Vag-Spont   CRAIG   1 Term 17 39w0d  2.41 kg (5 lb 5 oz) M Vag-Spont   CRAIG       Dating reviewed    Allergies and problem list reviewed and updated    Medical and surgical history reviewed    Prenatal labs reviewed and updated    Physical Exam:  ABD: soft, gravid, nontender    Assessment:  Margy was seen today for routine prenatal visit.    Diagnoses and all orders for this visit:    Supervision of other high risk pregnancies, second trimester  -     CBC Auto Differential; Future  -     OB Glucose Screen; Future    Constipation, unspecified constipation type    Asthma affecting pregnancy in second trimester    Bipolar affective disorder in remission    18 weeks gestation of pregnancy        Orders Placed This Encounter   Procedures    CBC Auto Differential    OB Glucose Screen     Moods stable.  Anatomy scheduled.  Discussed constipation recommendations  Asthma - stable, had increased symptoms with a URI. If baseline symptom increase, instructed her to let me know.  Glucola with 24 week visit.  Labor, ROM and bleeding precautions.     Follow up for routine OB.

## 2024-09-23 ENCOUNTER — PROCEDURE VISIT (OUTPATIENT)
Dept: MATERNAL FETAL MEDICINE | Facility: CLINIC | Age: 34
End: 2024-09-23
Payer: MEDICAID

## 2024-09-23 DIAGNOSIS — Z36.2 ENCOUNTER FOR FOLLOW-UP ULTRASOUND OF FETAL ANATOMY: Primary | ICD-10-CM

## 2024-09-23 DIAGNOSIS — Z36.89 ENCOUNTER FOR FETAL ANATOMIC SURVEY: ICD-10-CM

## 2024-09-23 PROCEDURE — 76811 OB US DETAILED SNGL FETUS: CPT | Mod: PBBFAC | Performed by: OBSTETRICS & GYNECOLOGY

## 2024-09-29 DIAGNOSIS — O09.92 SUPERVISION OF HIGH RISK PREGNANCY IN SECOND TRIMESTER: ICD-10-CM

## 2024-09-29 DIAGNOSIS — O21.9 NAUSEA AND VOMITING IN PREGNANCY: ICD-10-CM

## 2024-09-30 ENCOUNTER — PATIENT MESSAGE (OUTPATIENT)
Dept: OTHER | Facility: OTHER | Age: 34
End: 2024-09-30
Payer: MEDICAID

## 2024-09-30 RX ORDER — ONDANSETRON 8 MG/1
8 TABLET, ORALLY DISINTEGRATING ORAL EVERY 6 HOURS PRN
Qty: 30 TABLET | Refills: 1 | Status: SHIPPED | OUTPATIENT
Start: 2024-09-30

## 2024-09-30 NOTE — TELEPHONE ENCOUNTER
Refill Routing Note   Medication(s) are not appropriate for processing by [facility] for the following reason(s):      Medication outside of protocol    [facility] action(s):  Route Care Due:  None identified            Appointments  past 12m or future 3m with PCP    Date Provider   Last Visit   9/17/2024 Lori Sandoval MD   Next Visit   10/29/2024 Lori Sandoval MD   ED visits in past 90 days: 0        Note composed:6:34 AM 09/30/2024

## 2024-10-02 ENCOUNTER — E-VISIT (OUTPATIENT)
Dept: OBSTETRICS AND GYNECOLOGY | Facility: CLINIC | Age: 34
End: 2024-10-02
Payer: MEDICAID

## 2024-10-02 DIAGNOSIS — O26.892 HEARTBURN DURING PREGNANCY IN SECOND TRIMESTER: Primary | ICD-10-CM

## 2024-10-02 DIAGNOSIS — R12 HEARTBURN DURING PREGNANCY IN SECOND TRIMESTER: Primary | ICD-10-CM

## 2024-10-03 RX ORDER — FAMOTIDINE 20 MG/1
20 TABLET, FILM COATED ORAL DAILY PRN
Qty: 30 TABLET | Refills: 11 | Status: SHIPPED | OUTPATIENT
Start: 2024-10-03 | End: 2025-10-03

## 2024-10-03 NOTE — PROGRESS NOTES
Patient ID: Margy Tai is a 33 y.o. female.    Chief Complaint: General Illness (Entered automatically based on patient selection in [employer].)    The patient initiated a request through [employer] on 10/2/2024 for evaluation and management with a chief complaint of General Illness (Entered automatically based on patient selection in [employer].)     I evaluated the questionnaire submission on 10/3/24.    [facility] Peq Evisit [facility]    10/2/2024  9:13 PM CDT - Filed by Patient   What do you need help with? Heart Burn   Do you agree to participate in an E-Visit? Yes   If you have any of the following symptoms, please present to your local emergency room or call 911: I acknowledge   If you have any of the following symptoms, please schedule a visit with your primary care provider: I acknowledge   Are you pregnant, could you be pregnant, or are you breast feeding? Pregnant   What is the main issue you would like addressed today? I have heartburn really bad can i take Pepto or pepcid or do i need something else its all day heartburn with no relief   How long have you had heartburn? More than two days and less than one week   How often do you experience heartburn? All the time   Where do you feel the heartburn? In the middle of my chest;  In my stomach   How long does your heartburn last? It lasts several hours   Does your heartburn wake you from sleep? Yes   Does your heartburn limit your ability to do thing you need to do? Yes   Does your heartburn change depending on whether you are sitting, standing, or lying down? Yes   Which of the following are you experiencing: Stomach fluid entering the back of your throat;  Frequent sore throat;  Fullness in the back of your throat   Do you have any of the following? Wheezing or worsening asthma;  Cough   Do you have trouble or pain with  swallowing? No   While eating, do you feel full quicker than usual? Yes   Do you have any of the following? Trouble breathing with activity    Which of the following makes the heartburn worse? Eating certain foods;  Lying down;  Tight clothing;  Stress   Do you have any of the following? None of the above   Have you lost weight lately without trying? No   Have you ever been told you have the following? None of the above   Have you seen a healthcare provider for your heartburn? I have never seen a provider for heartburn   Have you taken any medicines to relieve your heartburn in the past? H2 Blockers (Pepcid, Tagament, Zantac, Axid);  PPIs (Prilosec, Nexium, Prevacid, Prontonix)   Have the medicines provided relief? Yes   Have you had any of the following for heartburn? None of the above   Provide any additional information you feel is important. I took a little pepto but wasnt sure if it was safe so stopped and tums arent working anymore   Please attach any relevant images or files    Are you able to take your vital signs? No         Encounter Diagnosis   Name Primary?    Heartburn during pregnancy in second trimester Yes        No orders of the defined types were placed in this encounter.     Medications Ordered This Encounter   Medications    famotidine (PEPCID) 20 MG tablet     Sig: Take 1 tablet (20 mg total) by mouth daily as needed for Heartburn.     Dispense:  30 tablet     Refill:  11        No follow-ups on file.      E-Visit Time Tracking:    Day 1 Time (in minutes): 5    Total Time (in minutes): 5

## 2024-10-22 ENCOUNTER — E-VISIT (OUTPATIENT)
Dept: OBSTETRICS AND GYNECOLOGY | Facility: CLINIC | Age: 34
End: 2024-10-22
Payer: MEDICAID

## 2024-10-22 DIAGNOSIS — O09.90 SUPERVISION OF HIGH RISK PREGNANCY, ANTEPARTUM: Primary | ICD-10-CM

## 2024-10-23 NOTE — PROGRESS NOTES
Pt is pregnant and reports cols symptoms but also having difficulty breathing- pt has asthma. Recommended that she go to the [facility] for evaluation.

## 2024-10-25 ENCOUNTER — PATIENT MESSAGE (OUTPATIENT)
Dept: OBSTETRICS AND GYNECOLOGY | Facility: CLINIC | Age: 34
End: 2024-10-25
Payer: MEDICAID

## 2024-10-28 ENCOUNTER — PATIENT MESSAGE (OUTPATIENT)
Dept: OTHER | Facility: OTHER | Age: 34
End: 2024-10-28
Payer: MEDICAID

## 2024-10-29 ENCOUNTER — LAB VISIT (OUTPATIENT)
Dept: LAB | Facility: HOSPITAL | Age: 34
End: 2024-10-29
Attending: OBSTETRICS & GYNECOLOGY
Payer: MEDICAID

## 2024-10-29 ENCOUNTER — ROUTINE PRENATAL (OUTPATIENT)
Dept: OBSTETRICS AND GYNECOLOGY | Facility: CLINIC | Age: 34
End: 2024-10-29
Payer: MEDICAID

## 2024-10-29 VITALS
WEIGHT: 228.81 LBS | BODY MASS INDEX: 39.28 KG/M2 | DIASTOLIC BLOOD PRESSURE: 76 MMHG | SYSTOLIC BLOOD PRESSURE: 110 MMHG | HEART RATE: 88 BPM

## 2024-10-29 DIAGNOSIS — O09.90 SUPERVISION OF HIGH RISK PREGNANCY, ANTEPARTUM: Primary | ICD-10-CM

## 2024-10-29 DIAGNOSIS — J45.909 ASTHMA AFFECTING PREGNANCY IN THIRD TRIMESTER: ICD-10-CM

## 2024-10-29 DIAGNOSIS — O09.892 SUPERVISION OF OTHER HIGH RISK PREGNANCIES, SECOND TRIMESTER: ICD-10-CM

## 2024-10-29 DIAGNOSIS — Z3A.25 25 WEEKS GESTATION OF PREGNANCY: ICD-10-CM

## 2024-10-29 DIAGNOSIS — O09.92 SUPERVISION OF HIGH RISK PREGNANCY IN SECOND TRIMESTER: ICD-10-CM

## 2024-10-29 DIAGNOSIS — O99.513 ASTHMA AFFECTING PREGNANCY IN THIRD TRIMESTER: ICD-10-CM

## 2024-10-29 LAB
BASOPHILS # BLD AUTO: 0.01 K/UL (ref 0–0.2)
BASOPHILS NFR BLD: 0.1 % (ref 0–1.9)
DIFFERENTIAL METHOD BLD: ABNORMAL
EOSINOPHIL # BLD AUTO: 0.1 K/UL (ref 0–0.5)
EOSINOPHIL NFR BLD: 1 % (ref 0–8)
ERYTHROCYTE [DISTWIDTH] IN BLOOD BY AUTOMATED COUNT: 13 % (ref 11.5–14.5)
GLUCOSE SERPL-MCNC: 144 MG/DL (ref 70–140)
HCT VFR BLD AUTO: 35.5 % (ref 37–48.5)
HGB BLD-MCNC: 12.3 G/DL (ref 12–16)
IMM GRANULOCYTES # BLD AUTO: 0.04 K/UL (ref 0–0.04)
IMM GRANULOCYTES NFR BLD AUTO: 0.5 % (ref 0–0.5)
LYMPHOCYTES # BLD AUTO: 1.5 K/UL (ref 1–4.8)
LYMPHOCYTES NFR BLD: 19.4 % (ref 18–48)
MCH RBC QN AUTO: 31.6 PG (ref 27–31)
MCHC RBC AUTO-ENTMCNC: 34.6 G/DL (ref 32–36)
MCV RBC AUTO: 91 FL (ref 82–98)
MONOCYTES # BLD AUTO: 0.3 K/UL (ref 0.3–1)
MONOCYTES NFR BLD: 3.5 % (ref 4–15)
NEUTROPHILS # BLD AUTO: 5.9 K/UL (ref 1.8–7.7)
NEUTROPHILS NFR BLD: 75.5 % (ref 38–73)
NRBC BLD-RTO: 0 /100 WBC
PLATELET # BLD AUTO: 214 K/UL (ref 150–450)
PMV BLD AUTO: 10.9 FL (ref 9.2–12.9)
RBC # BLD AUTO: 3.89 M/UL (ref 4–5.4)
WBC # BLD AUTO: 7.8 K/UL (ref 3.9–12.7)

## 2024-10-29 PROCEDURE — 99213 OFFICE O/P EST LOW 20 MIN: CPT | Mod: TH,S$PBB,, | Performed by: OBSTETRICS & GYNECOLOGY

## 2024-10-29 PROCEDURE — 82950 GLUCOSE TEST: CPT | Performed by: OBSTETRICS & GYNECOLOGY

## 2024-10-29 PROCEDURE — 85025 COMPLETE CBC W/AUTO DIFF WBC: CPT | Performed by: OBSTETRICS & GYNECOLOGY

## 2024-10-29 PROCEDURE — 99999 PR PBB SHADOW E&M-EST. PATIENT-LVL III: CPT | Mod: PBBFAC,,, | Performed by: OBSTETRICS & GYNECOLOGY

## 2024-10-29 PROCEDURE — 99213 OFFICE O/P EST LOW 20 MIN: CPT | Mod: PBBFAC,TH,PN | Performed by: OBSTETRICS & GYNECOLOGY

## 2024-10-29 PROCEDURE — 86787 VARICELLA-ZOSTER ANTIBODY: CPT | Performed by: OBSTETRICS & GYNECOLOGY

## 2024-10-29 RX ORDER — FLUTICASONE PROPIONATE 44 UG/1
1 AEROSOL, METERED RESPIRATORY (INHALATION) 2 TIMES DAILY
Qty: 10.6 G | Refills: 11 | Status: SHIPPED | OUTPATIENT
Start: 2024-10-29 | End: 2025-10-29

## 2024-10-29 NOTE — PROGRESS NOTES
She had a upper respiratory infection - cough, fever/chills for 2 days, congestion, mucus/phlegm. Partner has cold now. Reports increased need for inhaler, has felt SOB and feels like she needs the inhaler every 30 - 60 min. Family member in the hospital with RSV.   Good FM. Denies VB, LOF, CTX.     /76   Pulse 88   Wt 103.8 kg (228 lb 13.4 oz)   BMI 39.28 kg/m²     33 y.o., at 25w0d by Estimated Date of Delivery: 25  Patient Active Problem List   Diagnosis    Pyelonephritis    Bipolar affective disorder in remission    Hx of status asthmaticus     OB History    Para Term  AB Living   3 2 2     2   SAB IAB Ectopic Multiple Live Births           2      # Outcome Date GA Lbr Murphy/2nd Weight Sex Type Anes PTL Lv   3 Current            2 Term 18 40w1d  3.289 kg (7 lb 4 oz) F Vag-Spont   CRAIG   1 Term 17 39w0d  2.41 kg (5 lb 5 oz) M Vag-Spont   CRAIG       Dating reviewed    Allergies and problem list reviewed and updated    Medical and surgical history reviewed    Prenatal labs reviewed and updated    Physical Exam:  ABD: soft, gravid, nontender    Assessment:  Margy was seen today for routine prenatal visit.    Diagnoses and all orders for this visit:    Supervision of high risk pregnancy, antepartum    Asthma affecting pregnancy in third trimester  -     Ambulatory referral/consult to Pulmonology; Future  -     fluticasone propionate (FLOVENT HFA) 44 mcg/actuation inhaler; Inhale 1 puff into the lungs 2 (two) times daily. Controller    25 weeks gestation of pregnancy        Orders Placed This Encounter   Procedures    Ambulatory referral/consult to Pulmonology     Asthma - discussed importance of good control in pregnancy. Added Flovent. Referral to Pulmonology. Precautions.   Mood stable.  Glucola today.   Declines flu vaccine today.   Discussed cold medications in pregnancy.   Labor, ROM and bleeding precautions.     Follow up in about 4 weeks (around 2024) for routine  OB.

## 2024-10-29 NOTE — PATIENT INSTRUCTIONS
Cold medications:  Tylenol up to 3000 mg in 24 hours - 1000 mg every 8 hours as needed. Keep fever <100.4.  To relieve congestion, you may use Ocean Nasal Spray, a saline nasal spray, Afrin for up 3 days.  Sudafed sparingly (pseudoephrine).  Delsym for cough.  Robitussin for mucus relief.   You can also try using a humidifier.  Chloraseptic spray or any throat lozenges.

## 2024-10-30 ENCOUNTER — PATIENT MESSAGE (OUTPATIENT)
Dept: OBSTETRICS AND GYNECOLOGY | Facility: CLINIC | Age: 34
End: 2024-10-30
Payer: MEDICAID

## 2024-10-30 ENCOUNTER — PROCEDURE VISIT (OUTPATIENT)
Dept: MATERNAL FETAL MEDICINE | Facility: CLINIC | Age: 34
End: 2024-10-30
Payer: MEDICAID

## 2024-10-30 ENCOUNTER — TELEPHONE (OUTPATIENT)
Dept: OBSTETRICS AND GYNECOLOGY | Facility: CLINIC | Age: 34
End: 2024-10-30
Payer: MEDICAID

## 2024-10-30 DIAGNOSIS — O99.810 ABNORMAL GLUCOSE AFFECTING PREGNANCY: Primary | ICD-10-CM

## 2024-10-30 DIAGNOSIS — Z36.2 ENCOUNTER FOR FOLLOW-UP ULTRASOUND OF FETAL ANATOMY: ICD-10-CM

## 2024-10-30 LAB
VARICELLA INTERPRETATION: POSITIVE
VARICELLA ZOSTER IGG: 687

## 2024-10-30 PROCEDURE — 76816 OB US FOLLOW-UP PER FETUS: CPT | Mod: PBBFAC | Performed by: OBSTETRICS & GYNECOLOGY

## 2024-11-02 DIAGNOSIS — O21.9 NAUSEA AND VOMITING IN PREGNANCY: ICD-10-CM

## 2024-11-02 DIAGNOSIS — O09.92 SUPERVISION OF HIGH RISK PREGNANCY IN SECOND TRIMESTER: ICD-10-CM

## 2024-11-03 RX ORDER — ONDANSETRON 8 MG/1
8 TABLET, ORALLY DISINTEGRATING ORAL EVERY 6 HOURS PRN
Qty: 30 TABLET | Refills: 1 | Status: SHIPPED | OUTPATIENT
Start: 2024-11-03

## 2024-11-03 NOTE — TELEPHONE ENCOUNTER
Refill Routing Note   Medication(s) are not appropriate for processing by [facility] for the following reason(s):      Medication outside of protocol    [facility] action(s):  Route Care Due:  None identified            Appointments  past 12m or future 3m with PCP    Date Provider   Last Visit   10/29/2024 Lori Sandoval MD   Next Visit   11/26/2024 Lori Sandoval MD   ED visits in past 90 days: 0        Note composed:11:29 AM 11/03/2024

## 2024-11-04 ENCOUNTER — PATIENT MESSAGE (OUTPATIENT)
Dept: OBSTETRICS AND GYNECOLOGY | Facility: CLINIC | Age: 34
End: 2024-11-04
Payer: MEDICAID

## 2024-11-04 DIAGNOSIS — O26.892 HEARTBURN DURING PREGNANCY IN SECOND TRIMESTER: ICD-10-CM

## 2024-11-04 DIAGNOSIS — R12 HEARTBURN DURING PREGNANCY IN SECOND TRIMESTER: ICD-10-CM

## 2024-11-05 RX ORDER — FAMOTIDINE 20 MG/1
20 TABLET, FILM COATED ORAL DAILY PRN
Qty: 30 TABLET | Refills: 11 | Status: SHIPPED | OUTPATIENT
Start: 2024-11-05 | End: 2025-11-05

## 2024-11-05 NOTE — TELEPHONE ENCOUNTER
Refill Routing Note   Medication(s) are not appropriate for processing by [facility] for the following reason(s):        Outside of protocol    [facility] action(s):  Route               Appointments  past 12m or future 3m with PCP    Date Provider   Last Visit   10/29/2024 Lori Sandoval MD   Next Visit   11/26/2024 Lori Sandoval MD   ED visits in past 90 days: 0        Note composed:5:48 AM 11/05/2024

## 2024-11-06 ENCOUNTER — LAB VISIT (OUTPATIENT)
Dept: LAB | Facility: HOSPITAL | Age: 34
End: 2024-11-06
Attending: OBSTETRICS & GYNECOLOGY
Payer: MEDICAID

## 2024-11-06 DIAGNOSIS — O99.810 ABNORMAL GLUCOSE AFFECTING PREGNANCY: ICD-10-CM

## 2024-11-06 LAB
GLUCOSE SERPL-MCNC: 103 MG/DL (ref 70–110)
GLUCOSE SERPL-MCNC: 118 MG/DL
GLUCOSE SERPL-MCNC: 139 MG/DL
GLUCOSE SERPL-MCNC: 139 MG/DL

## 2024-11-06 PROCEDURE — 82952 GTT-ADDED SAMPLES: CPT | Performed by: OBSTETRICS & GYNECOLOGY

## 2024-11-06 PROCEDURE — 36415 COLL VENOUS BLD VENIPUNCTURE: CPT | Mod: PN | Performed by: OBSTETRICS & GYNECOLOGY

## 2024-11-11 ENCOUNTER — PATIENT MESSAGE (OUTPATIENT)
Dept: OTHER | Facility: OTHER | Age: 34
End: 2024-11-11
Payer: MEDICAID

## 2024-11-25 ENCOUNTER — PATIENT MESSAGE (OUTPATIENT)
Dept: OTHER | Facility: OTHER | Age: 34
End: 2024-11-25
Payer: MEDICAID

## 2024-11-25 DIAGNOSIS — O21.9 NAUSEA AND VOMITING IN PREGNANCY: ICD-10-CM

## 2024-11-25 DIAGNOSIS — R12 HEARTBURN DURING PREGNANCY IN SECOND TRIMESTER: ICD-10-CM

## 2024-11-25 DIAGNOSIS — O09.92 SUPERVISION OF HIGH RISK PREGNANCY IN SECOND TRIMESTER: ICD-10-CM

## 2024-11-25 DIAGNOSIS — O26.892 HEARTBURN DURING PREGNANCY IN SECOND TRIMESTER: ICD-10-CM

## 2024-11-26 ENCOUNTER — ROUTINE PRENATAL (OUTPATIENT)
Dept: OBSTETRICS AND GYNECOLOGY | Facility: CLINIC | Age: 34
End: 2024-11-26
Payer: MEDICAID

## 2024-11-26 ENCOUNTER — TELEPHONE (OUTPATIENT)
Dept: PULMONOLOGY | Facility: CLINIC | Age: 34
End: 2024-11-26
Payer: MEDICAID

## 2024-11-26 VITALS
DIASTOLIC BLOOD PRESSURE: 73 MMHG | BODY MASS INDEX: 40.32 KG/M2 | WEIGHT: 234.88 LBS | SYSTOLIC BLOOD PRESSURE: 121 MMHG

## 2024-11-26 DIAGNOSIS — O99.519 ASTHMA DURING PREGNANCY: ICD-10-CM

## 2024-11-26 DIAGNOSIS — J45.909 ASTHMA DURING PREGNANCY: ICD-10-CM

## 2024-11-26 DIAGNOSIS — Z3A.28 28 WEEKS GESTATION OF PREGNANCY: ICD-10-CM

## 2024-11-26 DIAGNOSIS — O09.93 SUPERVISION OF HIGH RISK PREGNANCY IN THIRD TRIMESTER: Primary | ICD-10-CM

## 2024-11-26 PROCEDURE — 99213 OFFICE O/P EST LOW 20 MIN: CPT | Mod: PBBFAC,TH,PN | Performed by: OBSTETRICS & GYNECOLOGY

## 2024-11-26 PROCEDURE — 99213 OFFICE O/P EST LOW 20 MIN: CPT | Mod: TH,S$PBB,, | Performed by: OBSTETRICS & GYNECOLOGY

## 2024-11-26 PROCEDURE — 99999 PR PBB SHADOW E&M-EST. PATIENT-LVL III: CPT | Mod: PBBFAC,,, | Performed by: OBSTETRICS & GYNECOLOGY

## 2024-11-26 RX ORDER — FAMOTIDINE 20 MG/1
20 TABLET, FILM COATED ORAL DAILY PRN
Qty: 30 TABLET | Refills: 11 | Status: SHIPPED | OUTPATIENT
Start: 2024-11-26 | End: 2025-11-26

## 2024-11-26 RX ORDER — ONDANSETRON 8 MG/1
8 TABLET, ORALLY DISINTEGRATING ORAL EVERY 6 HOURS PRN
Qty: 30 TABLET | Refills: 1 | Status: SHIPPED | OUTPATIENT
Start: 2024-11-26

## 2024-11-26 NOTE — PROGRESS NOTES
Good FM. Denies VB, LOF, CTX.   Feeling better from an asthma standpoint, has not been able to get scheduled with pulmonology. She is using albuterol inhaler as needed.    /73   Wt 106.5 kg (234 lb 14.4 oz)   BMI 40.32 kg/m²     33 y.o., at 28w1d by Estimated Date of Delivery: 25  Patient Active Problem List   Diagnosis    Pyelonephritis    Bipolar affective disorder in remission    Hx of status asthmaticus     OB History    Para Term  AB Living   3 2 2     2   SAB IAB Ectopic Multiple Live Births           2      # Outcome Date GA Lbr Murphy/2nd Weight Sex Type Anes PTL Lv   3 Current            2 Term 18 40w1d  3.289 kg (7 lb 4 oz) F Vag-Spont   CRAIG   1 Term 17 39w0d  2.41 kg (5 lb 5 oz) M Vag-Spont   CRAIG       Dating reviewed    Allergies and problem list reviewed and updated    Medical and surgical history reviewed    Prenatal labs reviewed and updated    Physical Exam:  ABD: soft, gravid, nontender    Assessment:  Margy was seen today for routine prenatal visit.    Diagnoses and all orders for this visit:    Supervision of high risk pregnancy in third trimester    Asthma during pregnancy  -     Ambulatory referral/consult to Pulmonology; Future    28 weeks gestation of pregnancy        Orders Placed This Encounter   Procedures    Ambulatory referral/consult to Pulmonology     Referral to outside Pulmonology clinic sent - will start with [facility].   Tdap and flu vaccines today.   Labor, ROM and bleeding precautions.     Follow up in about 4 weeks (around 2024) for routine OB.

## 2024-11-26 NOTE — TELEPHONE ENCOUNTER
Refill Routing Note   Medication(s) are not appropriate for processing by [facility] for the following reason(s):        Outside of protocol( pregnancy)    [facility] action(s):  Route        Medication Therapy Plan: Current Pregnancy      Appointments  past 12m or future 3m with PCP    Date Provider   Last Visit   10/29/2024 Lori Sandoval MD   Next Visit   11/26/2024 Lori Sandoval MD   ED visits in past 90 days: 0        Note composed:6:00 AM 11/26/2024

## 2024-11-26 NOTE — TELEPHONE ENCOUNTER
----- Message from Lori Sandoval MD sent at 11/26/2024 10:16 AM CST -----  Hi,   I am trying to get this patient scheduled with a pulmonology provider. She is pregnant and has medicaid, so wondering if you have a provider that sees medicaid patients?    Thanks!

## 2024-12-09 ENCOUNTER — PATIENT MESSAGE (OUTPATIENT)
Dept: OTHER | Facility: OTHER | Age: 34
End: 2024-12-09
Payer: MEDICAID

## 2024-12-22 ENCOUNTER — ON-DEMAND VIRTUAL (OUTPATIENT)
Dept: URGENT CARE | Facility: CLINIC | Age: 34
End: 2024-12-22
Payer: MEDICAID

## 2024-12-22 ENCOUNTER — HOSPITAL ENCOUNTER (EMERGENCY)
Facility: OTHER | Age: 34
Discharge: HOME | End: 2024-12-22
Attending: OBSTETRICS & GYNECOLOGY
Payer: MEDICAID

## 2024-12-22 ENCOUNTER — NURSE TRIAGE (OUTPATIENT)
Dept: ADMINISTRATIVE | Facility: CLINIC | Age: 34
End: 2024-12-22
Payer: MEDICAID

## 2024-12-22 VITALS
OXYGEN SATURATION: 97 % | HEART RATE: 75 BPM | DIASTOLIC BLOOD PRESSURE: 68 MMHG | SYSTOLIC BLOOD PRESSURE: 124 MMHG | TEMPERATURE: 98 F | RESPIRATION RATE: 17 BRPM

## 2024-12-22 DIAGNOSIS — O36.8130 DECREASED FETAL MOVEMENT AFFECTING MANAGEMENT OF PREGNANCY IN THIRD TRIMESTER, SINGLE OR UNSPECIFIED FETUS: Primary | ICD-10-CM

## 2024-12-22 DIAGNOSIS — Z71.1: Primary | ICD-10-CM

## 2024-12-22 DIAGNOSIS — Z3A.31 31 WEEKS GESTATION OF PREGNANCY: ICD-10-CM

## 2024-12-22 LAB
BILIRUBIN, POC UA: NEGATIVE
BLOOD, POC UA: NEGATIVE
CLARITY, UA: CLEAR
COLOR, UA: YELLOW
GLUCOSE, POC UA: NEGATIVE
KETONES, POC UA: NEGATIVE
LEUKOCYTE EST, POC UA: ABNORMAL
NITRITE, POC UA: NEGATIVE
PH UR STRIP: 7 [PH] (ref 5–8)
PROTEIN, POC UA: NEGATIVE
SPECIFIC GRAVITY, POC UA: 1.01 (ref 1–1.03)
UROBILINOGEN, POC UA: 0.2 E.U./DL

## 2024-12-22 PROCEDURE — 99284 EMERGENCY DEPT VISIT MOD MDM: CPT | Mod: 25

## 2024-12-22 PROCEDURE — 59025 FETAL NON-STRESS TEST: CPT | Mod: 26,,, | Performed by: OBSTETRICS & GYNECOLOGY

## 2024-12-22 PROCEDURE — 99284 EMERGENCY DEPT VISIT MOD MDM: CPT | Mod: 25,,, | Performed by: OBSTETRICS & GYNECOLOGY

## 2024-12-22 PROCEDURE — 59025 FETAL NON-STRESS TEST: CPT

## 2024-12-23 ENCOUNTER — PATIENT MESSAGE (OUTPATIENT)
Dept: OTHER | Facility: OTHER | Age: 34
End: 2024-12-23
Payer: MEDICAID

## 2024-12-23 NOTE — DISCHARGE INSTRUCTIONS
Stay hydrated by drinking 2-3 liters of water per day. Call or come to the OB Emergency Department for signs of labor such as painful contractions, leaking of fluid, or bleeding. You may take Tylenol (2 regular strength or 1 extra strength) for discomforts of pregnancy such as mild cramping, soreness, and back pain. If you have a history of elevated blood pressures, call us for increased blood pressure readings at home, a headache that persists after taking Tylenol, blurred vision, shortness of breath, or sharp upper abdominal pain. Monitor your baby's movements. If you are concerned that your baby's movements are decreased, call the [facility] for further evaluation.     OB Emergency Department: [phone]

## 2024-12-23 NOTE — ED PROVIDER NOTES
Encounter Date: 2024       History     Chief Complaint   Patient presents with    Decreased Fetal Movement     Margy Tai is a 34 y.o. [id number] at 31w6d presents complaining of decreased fetal movement. She started taking Seroquel 5 days ago as prescribed by her OB psychiatrist and has noted decreased fetal movement since then. She reports only a handful of fetal movements since yesterday which is a change from baseline.     This IUP is complicated by Bipolar disorder &  Asthma.  Patient denies contractions, denies vaginal bleeding, denies LOF.   Fetal Movement: decreased     The history is provided by the patient. No  was used.     Review of patient's allergies indicates:   Allergen Reactions    Cyanocobalamin (vitamin b12) Other (See Comments)     Stated that she was foaming at the mouth with muscle tightness     Lamictal [lamotrigine] Hives    Promethazine Hives     Past Medical History:   Diagnosis Date    JAVI (acute kidney injury)     patient unsure of the cause, but was hospitalized for two weeks.    Asthma     Bipolar disorder, unspecified      No past surgical history on file.  Family History   Problem Relation Name Age of Onset    Kidney failure Mother          unilateral nephrectomy for unclear reason    Diabetes Sister      Diabetes Maternal Grandmother      Diabetes Maternal Uncle      Kidney failure Maternal Uncle          ESRD, h/o DM2, HTN    Diabetes Maternal Aunt       Social History     Tobacco Use    Smoking status: Former     Current packs/day: 0.00     Types: Cigarettes     Quit date: 2024     Years since quittin.4    Smokeless tobacco: Never   Substance Use Topics    Alcohol use: No    Drug use: Not Currently     Types: Marijuana     Comment: last used 24     Review of Systems   Constitutional:  Negative for chills, fatigue and fever.   HENT:  Negative for congestion.    Eyes:  Negative for visual disturbance.   Respiratory:  Negative for shortness  of breath.    Cardiovascular:  Negative for chest pain and leg swelling.   Gastrointestinal:  Negative for constipation, diarrhea, nausea and vomiting.   Genitourinary:  Negative for dysuria, vaginal bleeding and vaginal discharge.   Musculoskeletal:  Negative for arthralgias and joint swelling.   Skin:  Negative for rash.   Neurological:  Negative for weakness, light-headedness and headaches.   Psychiatric/Behavioral:  Negative for confusion.        Physical Exam     Initial Vitals [12/22/24 2230]   BP Pulse Resp Temp SpO2   123/67 85 17 97.6 °F (36.4 °C) 98 %      MAP       --         Physical Exam    Vitals reviewed.  Constitutional: She appears well-developed and well-nourished.   HENT:   Head: Normocephalic.   Eyes: EOM are normal.   Neck:   Normal range of motion.  Cardiovascular:  Normal rate.           Pulmonary/Chest: No respiratory distress.   Abdominal:   Gravid Abdomen There is no rebound and no guarding.   Musculoskeletal:         General: Normal range of motion.      Cervical back: Normal range of motion.     Neurological: She is alert and oriented to person, place, and time.   Skin: Skin is warm and dry.   Psychiatric: She has a normal mood and affect.         ED Course   Obtain Fetal nonstress test (NST)    Date/Time: 12/22/2024 10:40 PM    Performed by: Ekaterina Gabriel MD  Authorized by: Ekaterina Gabriel MD    Nonstress Test:     Variability:  6-25 BPM    Decelerations:  None    Accelerations:  15 bpm    Baseline:  140    Uterine Irritability: No      Contractions:  Not present  Biophysical Profile:     Nonstress Test Interpretation: reactive      Overall Impression:  Reassuring  Post-procedure:     Patient tolerance:  Patient tolerated the procedure well with no immediate complications    Labs Reviewed   POCT URINALYSIS W/O SCOPE - Abnormal       Result Value    Spec Grav UA 1.015      PH, UA 7.0      Protein, UA Negative      Glucose, UA Negative      Ketones, UA Negative      Bilirubin, UA  Negative      Blood, UA Negative      Leukocytes, UA Trace (*)     Nitrite, UA Negative      Urobilinogen, UA 0.2      Color, UA POC Yellow      Clarity, UA, POC Clear            Imaging Results    None          Medications - No data to display  Medical Decision Making  Margy Tai is a 34 y.o. [id number] at 31w6d presents complaining of decreased fetal movement.     Temp:  [97.6 °F (36.4 °C)] 97.6 °F (36.4 °C)  Pulse:  [75-85] 75  Resp:  [17] 17  SpO2:  [97 %-98 %] 97 %  BP: (123-124)/(67-68) 124/68     - VSS & WNL  - PE as above  - NST: 140 bpm, mod BTBV, + accels, - decels, R&R  - TOCO: no ctx or uterine irritability   - BSUS: active fetus visualized, transverse presentation, MVP 5cm, posterior placenta   - Udip: trace leuks, otherwise WNL  - Patient able to appreciate fetal movement during BSUS  - Patient stable for DC home  - Patient verbalizes understanding & is in agreement with plan  - Given ED return precautions      Amount and/or Complexity of Data Reviewed  Labs:  Decision-making details documented in ED Course.              Attending Attestation:   Physician Attestation Statement for Resident:  As the supervising MD   Physician Attestation Statement: I have personally seen and examined this patient.   I agree with the above history.  -:   As the supervising MD I agree with the above PE.     As the supervising MD I agree with the above treatment, course, plan, and disposition.   I was personally present during the critical portions of the procedure(s) performed by the resident and was immediately available in the ED to provide services and assistance as needed during the entire procedure.  I have reviewed and agree with the residents interpretation of the following: lab data.  I have reviewed the following: old records at this facility.            Attending ED Notes:   I saw and examined the patient myself along with the resident. I have personally reviewed pertinent prior records, labs, imaging, and  procedures. I have reviewed the documentation and agree with the findings and plan as documented.      at  31w6d presenting with decreased fetal movement. NST reactive and reassuring. Bedside US with active fetus and normal MVP. Counseled regarding fetal kick counts. Discharge home in stable condition. Return precautions discussed.    Rhonda Osborn MD FACOG  OB Hospitalist          ED Course as of 24 0641   Sun Dec 22, 2024   2234 BP: 123/67 [AW]   2234 Temp: 97.6 °F (36.4 °C) [AW]   4 Pulse: 85 [AW]   2234 Resp: 17 [AW]   2234 SpO2: 98 % [AW]   2234 POCT URINALYSIS W/O SCOPE(!) [AW]   2234 Glucose, UA: Negative [AW]   2234 Protein, UA: Negative [AW]   2234 Ketones, UA: Negative [AW]   2234 Blood, UA: Negative [AW]   2234 Leukocytes, UA(!): Trace [AW]   4 Nitrite, UA: Negative [AW]      ED Course User Index  [AW] Ekaterina Gabriel MD                           Clinical Impression:  Final diagnoses:  [O36.8130] Decreased fetal movement affecting management of pregnancy in third trimester, single or unspecified fetus (Primary)  [Z3A.31] 31 weeks gestation of pregnancy          ED Disposition Condition    Discharge Stable          ED Prescriptions    None       Follow-up Information       Follow up With Specialties Details Why Contact Info    [facility] - Emergency Dept (Obstetrics) Emergency Medicine  If symptoms worsen [address] [facility] [id number] [phone]    Lori Sandoval MD Obstetrics and Gynecology  As needed, As scheduled [address] [facility] [address]  [phone]               Ekaterina Gabriel MD  Resident  24       Rhonad Osborn MD  [phone]

## 2024-12-23 NOTE — TELEPHONE ENCOUNTER
Patient reports decreased fetal movement x 2 days. Patient reports she started Seroquel a few days ago. Patient reports she slept most of the day yesterday but can only remember fetal movement 1 or 2 times yesterday. Mother reports fetal movement 4 times all day today. Mother states usually she counts 8-10 times a day. Advised patient of dispo to go to L&D. Verbalized understanding. Advised to call back if symptoms become worse or with further questions.      Reason for Disposition   [1] Pregnant 23 or more weeks AND [2] baby moving less today by kick count  (e.g., kick count < 5 in 1 hour or < 10 in 2 hours)    Additional Information   Negative: Sounds like a life-threatening emergency to the triager   Negative: New blurred vision or vision changes   Negative: [1] SEVERE headache AND [2] not relieved with acetaminophen (e.g., Tylenol)   Negative: Leakage of fluid from vagina    Protocols used: Pregnancy - Decreased Fetal Movement-A-AH

## 2024-12-23 NOTE — PROGRESS NOTES
Patient presents with concern regarding decreased fetal movement.  Thinks it could be related to seroquel which she started one week ago.  Advised that this is not a condition that can be adequately evaluated virtually and she should reach out to her OB.  Verbalized understanding.

## 2024-12-24 ENCOUNTER — ROUTINE PRENATAL (OUTPATIENT)
Dept: OBSTETRICS AND GYNECOLOGY | Facility: CLINIC | Age: 34
End: 2024-12-24
Payer: MEDICAID

## 2024-12-24 VITALS — DIASTOLIC BLOOD PRESSURE: 80 MMHG | SYSTOLIC BLOOD PRESSURE: 103 MMHG | BODY MASS INDEX: 40.17 KG/M2 | WEIGHT: 234 LBS

## 2024-12-24 DIAGNOSIS — O09.93 SUPERVISION OF HIGH RISK PREGNANCY IN THIRD TRIMESTER: Primary | ICD-10-CM

## 2024-12-24 DIAGNOSIS — Z3A.32 32 WEEKS GESTATION OF PREGNANCY: ICD-10-CM

## 2024-12-24 DIAGNOSIS — Z87.09 HX OF STATUS ASTHMATICUS: ICD-10-CM

## 2024-12-24 DIAGNOSIS — O26.849 UTERINE SIZE DATE DISCREPANCY PREGNANCY: ICD-10-CM

## 2024-12-24 DIAGNOSIS — F31.70 BIPOLAR AFFECTIVE DISORDER IN REMISSION: ICD-10-CM

## 2024-12-24 PROCEDURE — 99213 OFFICE O/P EST LOW 20 MIN: CPT | Mod: TH,S$PBB,, | Performed by: OBSTETRICS & GYNECOLOGY

## 2024-12-24 PROCEDURE — 99999 PR PBB SHADOW E&M-EST. PATIENT-LVL III: CPT | Mod: PBBFAC,,, | Performed by: OBSTETRICS & GYNECOLOGY

## 2024-12-24 PROCEDURE — 99213 OFFICE O/P EST LOW 20 MIN: CPT | Mod: PBBFAC,TH,PN | Performed by: OBSTETRICS & GYNECOLOGY

## 2024-12-24 NOTE — PROGRESS NOTES
Good FM. Denies VB, LOF, CTX.   She was seen in the [facility] this week for decreased fetal movement. Started seroquel which initially made her very sleepy, she then felt very little movement so went to [facility]. Feeling more movement now.   Asthma well-controlled off meds.     /80   Wt 106.1 kg (234 lb 0.3 oz)   BMI 40.17 kg/m²     34 y.o., at 32w1d by Estimated Date of Delivery: 25  Patient Active Problem List   Diagnosis    Pyelonephritis    Bipolar affective disorder in remission    Hx of status asthmaticus     OB History    Para Term  AB Living   3 2 2     2   SAB IAB Ectopic Multiple Live Births           2      # Outcome Date GA Lbr Murphy/2nd Weight Sex Type Anes PTL Lv   3 Current            2 Term 18 40w1d  3.289 kg (7 lb 4 oz) F Vag-Spont   CRAIG   1 Term 17 39w0d  2.41 kg (5 lb 5 oz) M Vag-Spont   CRAIG       Dating reviewed    Allergies and problem list reviewed and updated    Medical and surgical history reviewed    Prenatal labs reviewed and updated    Physical Exam:  ABD: soft, gravid, nontender    Assessment:  Margy was seen today for routine prenatal visit.    Diagnoses and all orders for this visit:    Supervision of high risk pregnancy in third trimester  -     CBC Auto Differential; Future  -     Treponema Pallidium Antibodies IgG, IgM; Future  -     HIV 1/2 Ag/Ab (4th Gen); Future    Uterine size date discrepancy pregnancy  -     US MFM Procedure (Viewpoint); Future    Hx of status asthmaticus    Bipolar affective disorder in remission    32 weeks gestation of pregnancy      Orders Placed This Encounter   Procedures    CBC Auto Differential    Treponema Pallidium Antibodies IgG, IgM    HIV 1/2 Ag/Ab (4th Gen)    US MFM Procedure ([facility])     Ashtma - well controlled off meds, has pulmonology appt  Bipolar - stable on seroquel, appreciate Psych management  S>D, growth scan ordered  Labor, ROM and bleeding precautions.     Follow up in about 2 weeks (around 2025) for  routine OB.

## 2024-12-25 DIAGNOSIS — R12 HEARTBURN DURING PREGNANCY IN SECOND TRIMESTER: ICD-10-CM

## 2024-12-25 DIAGNOSIS — O26.892 HEARTBURN DURING PREGNANCY IN SECOND TRIMESTER: ICD-10-CM

## 2024-12-26 ENCOUNTER — PATIENT MESSAGE (OUTPATIENT)
Dept: MATERNAL FETAL MEDICINE | Facility: CLINIC | Age: 34
End: 2024-12-26
Payer: MEDICAID

## 2024-12-26 RX ORDER — FAMOTIDINE 20 MG/1
20 TABLET, FILM COATED ORAL DAILY PRN
Qty: 30 TABLET | Refills: 11 | Status: SHIPPED | OUTPATIENT
Start: 2024-12-26 | End: 2025-12-26

## 2024-12-26 NOTE — TELEPHONE ENCOUNTER
Refill Routing Note   Medication(s) are not appropriate for processing by [facility] for the following reason(s):        Outside of protocol    [facility] action(s):  Route        Medication Therapy Plan: Active pregnancy      Appointments  past 12m or future 3m with PCP    Date Provider   Last Visit   12/24/2024 Lori Sandoval MD   Next Visit   1/7/2025 Lori Sandoval MD   ED visits in past 90 days: 1        Note composed:7:40 AM 12/26/2024

## 2024-12-31 ENCOUNTER — NURSE TRIAGE (OUTPATIENT)
Dept: ADMINISTRATIVE | Facility: CLINIC | Age: 34
End: 2024-12-31
Payer: MEDICAID

## 2024-12-31 NOTE — TELEPHONE ENCOUNTER
Pt 33 weeks 1 day pregnant. Pt states that she has head lice and asking if Nix treatment okay to use during pregnancy. Paged OBGYN per protocol. Received callback from Dr. Rogers. Advised that Nix treatment in safe to use. Pt made aware. VU. Encounter routed to provider.   Reason for Disposition   [1] Follow-up call from patient regarding patient's clinical status AND [2] information NON-URGENT    Protocols used: PCP Call - No Triage-A-

## 2025-01-03 ENCOUNTER — PATIENT MESSAGE (OUTPATIENT)
Dept: MATERNAL FETAL MEDICINE | Facility: CLINIC | Age: 35
End: 2025-01-03
Payer: MEDICAID

## 2025-01-07 ENCOUNTER — TELEPHONE (OUTPATIENT)
Dept: MATERNAL FETAL MEDICINE | Facility: CLINIC | Age: 35
End: 2025-01-07
Payer: MEDICAID

## 2025-01-07 ENCOUNTER — ROUTINE PRENATAL (OUTPATIENT)
Dept: OBSTETRICS AND GYNECOLOGY | Facility: CLINIC | Age: 35
End: 2025-01-07
Payer: MEDICAID

## 2025-01-07 VITALS
BODY MASS INDEX: 40.76 KG/M2 | SYSTOLIC BLOOD PRESSURE: 109 MMHG | DIASTOLIC BLOOD PRESSURE: 80 MMHG | WEIGHT: 237.44 LBS

## 2025-01-07 DIAGNOSIS — Z3A.34 34 WEEKS GESTATION OF PREGNANCY: ICD-10-CM

## 2025-01-07 DIAGNOSIS — M54.9 BACK PAIN AFFECTING PREGNANCY IN THIRD TRIMESTER: ICD-10-CM

## 2025-01-07 DIAGNOSIS — O09.93 SUPERVISION OF HIGH RISK PREGNANCY IN THIRD TRIMESTER: Primary | ICD-10-CM

## 2025-01-07 DIAGNOSIS — M54.30 SCIATIC LEG PAIN: ICD-10-CM

## 2025-01-07 DIAGNOSIS — O99.891 BACK PAIN AFFECTING PREGNANCY IN THIRD TRIMESTER: ICD-10-CM

## 2025-01-07 PROCEDURE — 99213 OFFICE O/P EST LOW 20 MIN: CPT | Mod: PBBFAC,TH,PN | Performed by: OBSTETRICS & GYNECOLOGY

## 2025-01-07 PROCEDURE — 99214 OFFICE O/P EST MOD 30 MIN: CPT | Mod: TH,S$PBB,, | Performed by: OBSTETRICS & GYNECOLOGY

## 2025-01-07 PROCEDURE — 99999 PR PBB SHADOW E&M-EST. PATIENT-LVL III: CPT | Mod: PBBFAC,,, | Performed by: OBSTETRICS & GYNECOLOGY

## 2025-01-07 RX ORDER — QUETIAPINE FUMARATE 25 MG/1
50 TABLET, FILM COATED ORAL DAILY
COMMUNITY

## 2025-01-07 NOTE — PROGRESS NOTES
Good FM. Denies VB, LOF, CTX.   Reports gums bleeding.     /80   Wt 107.7 kg (237 lb 7 oz)   BMI 40.76 kg/m²     34 y.o., at 34w1d by Estimated Date of Delivery: 25  Patient Active Problem List   Diagnosis    Pyelonephritis    Bipolar affective disorder in remission    Hx of status asthmaticus     OB History    Para Term  AB Living   3 2 2     2   SAB IAB Ectopic Multiple Live Births           2      # Outcome Date GA Lbr Murphy/2nd Weight Sex Type Anes PTL Lv   3 Current            2 Term 18 40w1d  3.289 kg (7 lb 4 oz) F Vag-Spont   CRAIG   1 Term 17 39w0d  2.41 kg (5 lb 5 oz) M Vag-Spont   CRAIG       Dating reviewed    Allergies and problem list reviewed and updated    Medical and surgical history reviewed    Prenatal labs reviewed and updated    Physical Exam:  ABD: soft, gravid, nontender    Assessment:  Margy was seen today for routine prenatal visit.    Diagnoses and all orders for this visit:    Supervision of high risk pregnancy in third trimester    Sciatic leg pain  -     Ambulatory Referral/Consult to Physical Therapy; Future    Back pain affecting pregnancy in third trimester  -     Ambulatory Referral/Consult to Physical Therapy; Future    34 weeks gestation of pregnancy        Orders Placed This Encounter   Procedures    Ambulatory Referral/Consult to Physical Therapy     Sciatic pain - discussed stretches. Referral to PT.   Bipolar - seroquel 50 mg currently, she is considering discussing with them to increase dose because not sleeping as well.   Asthma stable, not requiring albuterol.  RSV vaccine Rx given.   3T labs next visit.   Labor, ROM and bleeding precautions.     Follow up in about 2 weeks (around 2025) for routine OB.

## 2025-01-09 ENCOUNTER — PATIENT MESSAGE (OUTPATIENT)
Dept: OBSTETRICS AND GYNECOLOGY | Facility: CLINIC | Age: 35
End: 2025-01-09
Payer: MEDICAID

## 2025-01-09 ENCOUNTER — HOSPITAL ENCOUNTER (EMERGENCY)
Facility: OTHER | Age: 35
Discharge: HOME | End: 2025-01-09
Attending: OBSTETRICS & GYNECOLOGY
Payer: MEDICAID

## 2025-01-09 VITALS
BODY MASS INDEX: 41.99 KG/M2 | HEIGHT: 63 IN | DIASTOLIC BLOOD PRESSURE: 72 MMHG | SYSTOLIC BLOOD PRESSURE: 120 MMHG | TEMPERATURE: 98 F | OXYGEN SATURATION: 98 % | HEART RATE: 81 BPM | WEIGHT: 237 LBS | RESPIRATION RATE: 18 BRPM

## 2025-01-09 DIAGNOSIS — Z3A.34 34 WEEKS GESTATION OF PREGNANCY: ICD-10-CM

## 2025-01-09 DIAGNOSIS — N93.9 VAGINAL SPOTTING: Primary | ICD-10-CM

## 2025-01-09 PROCEDURE — 59025 FETAL NON-STRESS TEST: CPT | Mod: 26,,, | Performed by: OBSTETRICS & GYNECOLOGY

## 2025-01-09 PROCEDURE — 59025 FETAL NON-STRESS TEST: CPT

## 2025-01-09 PROCEDURE — 99284 EMERGENCY DEPT VISIT MOD MDM: CPT | Mod: 25

## 2025-01-09 PROCEDURE — 99284 EMERGENCY DEPT VISIT MOD MDM: CPT | Mod: 25,,, | Performed by: OBSTETRICS & GYNECOLOGY

## 2025-01-09 NOTE — ED PROVIDER NOTES
Encounter Date: 2025       History     Chief Complaint   Patient presents with    Vaginal Bleeding     Vaginal bleeding with clots after straining while having a BM. Approx 34 weeks preg. C/o mild cramping.      Margy Tai is a 34 y.o. [id number] at 34w3d presents complaining of contractions.     Patient reports she strained for bowel movement and started having bright red vaginal spotting. She reports passing 2 very small clots. She reports she was sexually active last night. She reports mild abdominal cramping, but no constant pain.     This IUP is complicated by bipolar (on seroquel), asthma, abnormal 1h gtt normal 3h.  Patient reports mild irregular contractions, reports vaginal spotting, denies LOF.   Fetal Movement: normal         Review of patient's allergies indicates:   Allergen Reactions    Cyanocobalamin (vitamin b12) Other (See Comments)     Stated that she was foaming at the mouth with muscle tightness     Lamictal [lamotrigine] Hives    Promethazine Hives     Past Medical History:   Diagnosis Date    JAVI (acute kidney injury)     patient unsure of the cause, but was hospitalized for two weeks.    Asthma     Bipolar disorder, unspecified      No past surgical history on file.  Family History   Problem Relation Name Age of Onset    Kidney failure Mother          unilateral nephrectomy for unclear reason    Diabetes Sister      Diabetes Maternal Grandmother      Diabetes Maternal Uncle      Kidney failure Maternal Uncle          ESRD, h/o DM2, HTN    Diabetes Maternal Aunt       Social History     Tobacco Use    Smoking status: Former     Current packs/day: 0.00     Types: Cigarettes     Quit date: 2024     Years since quittin.5    Smokeless tobacco: Never   Substance Use Topics    Alcohol use: No    Drug use: Not Currently     Types: Marijuana     Comment: last used 24     Review of Systems   Constitutional:  Negative for chills and fever.   HENT:  Negative for sore throat.    Eyes:   Negative for visual disturbance.   Respiratory:  Negative for shortness of breath.    Cardiovascular:  Negative for chest pain and leg swelling.   Gastrointestinal:  Positive for abdominal pain (mild, irregular abdominal cramping). Negative for nausea and vomiting.   Genitourinary:  Positive for vaginal bleeding (vaginal spotting). Negative for dysuria.   Musculoskeletal:  Negative for back pain.   Skin:  Negative for rash.   Neurological:  Negative for weakness and headaches.   Hematological:  Does not bruise/bleed easily.   Psychiatric/Behavioral: Negative.         Physical Exam     Initial Vitals [01/09/25 1721]   BP Pulse Resp Temp SpO2   113/62 (!) 118 20 98.6 °F (37 °C) 97 %      MAP       --         Physical Exam    Vitals reviewed.  Constitutional: She appears well-developed and well-nourished. She is not diaphoretic. No distress.   HENT:   Head: Normocephalic and atraumatic.   Nose: Nose normal.   Eyes: Conjunctivae and EOM are normal.   Neck:   Normal range of motion.  Cardiovascular:  Normal rate and intact distal pulses.           Pulmonary/Chest: No respiratory distress. She exhibits no tenderness.   Abdominal: Abdomen is soft. There is no abdominal tenderness. There is no rebound and no guarding.   Musculoskeletal:         General: No tenderness or edema. Normal range of motion.      Cervical back: Normal range of motion.     Neurological: She is alert and oriented to person, place, and time.   Skin: Skin is warm and dry.   Psychiatric: She has a normal mood and affect. Her behavior is normal. Judgment and thought content normal.     OB LABOR EXAM:   Pre-Term Labor: No.   Membranes ruptured: No.   Method: Sterile vaginal exam per MD and Sterile speculum exam per MD.   Vaginal Bleeding: spotting.     Dilatation: 0.   Station: -5.   Effacement: 40%.   Amniotic Fluid Color: no fluid.   Amniotic Fluid Amount: none noted.         ED Course   Obtain Fetal nonstress test (NST)    Date/Time: 1/9/2025 6:02  PM    Performed by: Karen Pepper MD  Authorized by: Karen Pepper MD    Nonstress Test:     Variability:  6-25 BPM    Decelerations:  None    Accelerations:  15 bpm    Acoustic Stimulator: No      Baseline:  130    Uterine Irritability: No      Contractions:  Not present  Biophysical Profile:     Nonstress Test Interpretation: reactive      Overall Impression:  Reassuring    Labs Reviewed - No data to display       Imaging Results    None          Medications - No data to display  Medical Decision Making  34 y.o. [id number] at 34w3d presents complaining of contractions.     NST R&R  [place] w/ irritability, no CTX    Temp:  [98.6 °F (37 °C)] 98.6 °F (37 °C)  Pulse:  [118] 118  Resp:  [20] 20  SpO2:  [97 %] 97 %  BP: (113)/(62) 113/62     Vaginal spotting  - VSS  - SSE: dark red blood vaginal blood in vaginal vault mixed with mucus, cleared with 3 procto swabs, no active bleeding, cervix visually closed  - SVE: cl/th/hi    Abdominal cramping  - [place] w/ irritability, no CTX  - SVE cl/th/hi  - NST R&R     - Patient stable for discharge at this time  - ED return precautions given  - She voiced understanding and is in agreement with the plan              Attending Attestation:   Physician Attestation Statement for Resident:  As the supervising MD   Physician Attestation Statement: I have personally seen and examined this patient.   I agree with the above history.  -:   As the supervising MD I agree with the above PE.     As the supervising MD I agree with the above treatment, course, plan, and disposition.   I was personally present during the critical portions of the procedure(s) performed by the resident and was immediately available in the ED to provide services and assistance as needed during the entire procedure.   I have reviewed the following: old records at this facility.            Attending ED Notes:   I have personally seen and examined the patient. I agree with the resident's note . Questions welcomed  and answered to patient satisfaction.      @ 34w3d  presenting for vaginal spotting in pregnancy  Recent intercourse, given precautions. USG reviewed, no previa  SSE: closed, minimal old blood with mucus plug still intact.   NST: reactive and reassuring     Agree with discharge to home, and given the following instructions: Resume normal activities, encouraged to hydrate well (3L or 100oz of fluids daily). Return to the OB ED for acute concerns such as vaginal bleeding, frequent or painful contractions, loss of fluid or decreased fetal movement.     Return to clinic as scheduled.     Devi Lynch MD  2025 8:46 PM                                 Clinical Impression:  Final diagnoses:  [N93.9] Vaginal spotting (Primary)  [Z3A.34] 34 weeks gestation of pregnancy                 Karen Pepper MD  Resident  25       Devi Ortiz MD  256

## 2025-01-10 NOTE — DISCHARGE INSTRUCTIONS
Call clinic [phone] or L & D after hours at [phone] for vaginal bleeding, leakage of fluids, contractions 4-5 in one hour, decreased fetal movements ( 10 kicks in 2 hours), headache not relieved by Tylenol, blurry vision, or temp of 100.4 or greater.  Begin doing fetal kick counts, at least 10 movements in 2 hours starting at 28 weeks gestation.    Keep next clinic appointment

## 2025-01-13 ENCOUNTER — PATIENT MESSAGE (OUTPATIENT)
Dept: OTHER | Facility: OTHER | Age: 35
End: 2025-01-13
Payer: MEDICAID

## 2025-01-13 DIAGNOSIS — O26.892 HEARTBURN DURING PREGNANCY IN SECOND TRIMESTER: ICD-10-CM

## 2025-01-13 DIAGNOSIS — O09.92 SUPERVISION OF HIGH RISK PREGNANCY IN SECOND TRIMESTER: ICD-10-CM

## 2025-01-13 DIAGNOSIS — O21.9 NAUSEA AND VOMITING IN PREGNANCY: ICD-10-CM

## 2025-01-13 DIAGNOSIS — R12 HEARTBURN DURING PREGNANCY IN SECOND TRIMESTER: ICD-10-CM

## 2025-01-14 RX ORDER — ONDANSETRON 8 MG/1
8 TABLET, ORALLY DISINTEGRATING ORAL EVERY 6 HOURS PRN
Qty: 30 TABLET | Refills: 1 | Status: SHIPPED | OUTPATIENT
Start: 2025-01-14

## 2025-01-14 RX ORDER — FAMOTIDINE 20 MG/1
20 TABLET, FILM COATED ORAL DAILY PRN
Qty: 30 TABLET | Refills: 11 | Status: SHIPPED | OUTPATIENT
Start: 2025-01-14 | End: 2026-01-14

## 2025-01-14 NOTE — TELEPHONE ENCOUNTER
Refill Routing Note   Medication(s) are not appropriate for processing by [facility] for the following reason(s):        Outside of protocol    [facility] action(s):  Route             Appointments  past 12m or future 3m with PCP    Date Provider   Last Visit   1/7/2025 Lori Sandoval MD   Next Visit   1/21/2025 Lori Sandoval MD   ED visits in past 90 days: 2        Note composed:10:02 PM 01/13/2025

## 2025-01-15 ENCOUNTER — PROCEDURE VISIT (OUTPATIENT)
Dept: MATERNAL FETAL MEDICINE | Facility: CLINIC | Age: 35
End: 2025-01-15
Payer: MEDICAID

## 2025-01-15 DIAGNOSIS — O26.849 UTERINE SIZE DATE DISCREPANCY PREGNANCY: ICD-10-CM

## 2025-01-15 PROCEDURE — 76816 OB US FOLLOW-UP PER FETUS: CPT | Mod: PBBFAC | Performed by: OBSTETRICS & GYNECOLOGY

## 2025-01-16 ENCOUNTER — OFFICE VISIT (OUTPATIENT)
Dept: PULMONOLOGY | Facility: CLINIC | Age: 35
End: 2025-01-16
Payer: MEDICAID

## 2025-01-16 VITALS — BODY MASS INDEX: 41.99 KG/M2 | HEIGHT: 63 IN | WEIGHT: 237 LBS

## 2025-01-16 DIAGNOSIS — J45.909 ASTHMA DURING PREGNANCY: ICD-10-CM

## 2025-01-16 DIAGNOSIS — J45.909 MODERATE ASTHMA WITHOUT COMPLICATION, UNSPECIFIED WHETHER PERSISTENT: Primary | ICD-10-CM

## 2025-01-16 DIAGNOSIS — O99.519 ASTHMA DURING PREGNANCY: ICD-10-CM

## 2025-01-16 PROBLEM — Z87.09 HX OF STATUS ASTHMATICUS: Status: RESOLVED | Noted: 2024-07-19 | Resolved: 2025-01-16

## 2025-01-16 RX ORDER — ALBUTEROL SULFATE 0.83 MG/ML
2.5 SOLUTION RESPIRATORY (INHALATION) EVERY 6 HOURS PRN
Qty: 180 ML | Refills: 1 | Status: SHIPPED | OUTPATIENT
Start: 2025-01-16 | End: 2026-01-16

## 2025-01-16 NOTE — ASSESSMENT & PLAN NOTE
- triggers pre-pregnancy included smoking which she is planning to continue cessation  - gio and feno after recovered from delivery  - discussed when to use and purpose of flovent- to begin taking morning and night, reviewed swishing mouth out afterwards  - discussed when to use albuterol and warning signs for asthma flare  - prescribed nebulizer for pt to have at home  - in prior pregnancies had worsening asthma symptoms after delivery

## 2025-01-16 NOTE — PROGRESS NOTES
Subjective:      Patient ID: Margy Tai is a 34 y.o. female.    Chief Complaint: Asthma    The patient location is: [place]    Visit type: Audiovisual    Face to Face time with patient: 11 minutes  30 minutes of total time spent on the encounter, which includes face to face time and non-face to face time preparing to see the patient (eg, review of tests), Obtaining and/or reviewing separately obtained history, Documenting clinical information in the electronic or other health record, Independently interpreting results (not separately reported) and communicating results to the patient/family/caregiver, or Care coordination (not separately reported).     Each patient to whom he or she provides medical services by telemedicine is:  (1) informed of the relationship between the physician and patient and the respective role of any other health care provider with respect to management of the patient; and (2) notified that he or she may decline to receive medical services by telemedicine and may withdraw from such care at any time.    HPI 25:    34 year old , 35 weeks pregnant female with history of Bipolar disease and Asthma who is referred to Pulmonary clinic by Lori Sandoval MD for evaluation of asthma.    Diagnosed with asthma when she was 13 rs old. Has always had an inhaler with her. Started smoking when she was 27 years quit smoking with this pregnancy. Still using albuterol 1-2x/week. Asthma improved with smoking cessation. A little wheezing here and there.   No hx Ed or UC visits for asthma. Was always able to manage at home.     Up to date on flu, RSV.     Pertinent work Up:  - No prior chest imaging  - No prior pulmonary testing    Pulmonary Interventions:  - Albuterol HFA- 1-2x/week  - Flovent 44mcg- hasn't used, doesn't know what its for    Doesn't have a nebulizer    Smoking hx: was at least half a pack per day    Review of Systems   Respiratory:  Positive for cough, wheezing and dyspnea on  extertion.      Objective:     Physical Exam   Constitutional:   Pt viewed via phone camera, she is awake and alert, in no distress, able to provide clear and full history without cough or breathlessness     Personal Diagnostic Review  As Above      2025     6:10 PM 2025     6:05 PM 2025     6:00 PM 2025     5:55 PM 2025     5:50 PM 2025     5:45 PM 2025     5:43 PM   Pulmonary Function Tests   SpO2 98 % 98 % 98 % 98 % 99 % 98 % 98 %        Assessment:     No diagnosis found.     Outpatient Encounter Medications as of 2025   Medication Sig Dispense Refill    albuterol (VENTOLIN HFA) 90 mcg/actuation inhaler Inhale 2 puffs into the lungs every 6 (six) hours as needed for Wheezing. Rescue (Patient not taking: Reported on 2025) 18 g 3    benzonatate (TESSALON PERLES) 100 MG capsule Take 2 capsules (200 mg total) by mouth 3 (three) times daily as needed for Cough. (Patient not taking: Reported on 2024) 30 capsule 1    famotidine (PEPCID) 20 MG tablet Take 1 tablet (20 mg total) by mouth daily as needed for Heartburn. 30 tablet 11    fluticasone propionate (FLOVENT HFA) 44 mcg/actuation inhaler Inhale 1 puff into the lungs 2 (two) times daily. Controller 10.6 g 11    hydrocodone-acetaminophen 5-325mg (NORCO) 5-325 mg per tablet Take 1 tablet by mouth every 6 (six) hours as needed (severe pain not relieved by over the counter medicine.). (Patient not taking: Reported on 2022) 15 tablet 0    loratadine (CLARITIN) 10 mg tablet Take 1 tablet (10 mg total) by mouth once daily. 30 tablet 2    ondansetron (ZOFRAN-ODT) 8 MG TbDL Take 1 tablet (8 mg total) by mouth every 6 (six) hours as needed (nausea and vomiting). 30 tablet 1    prenatal vit no.124/iron/folic (PRENATAL VITAMIN ORAL) Take 1 Dose by mouth Daily.      QUEtiapine (SEROQUEL) 25 MG Tab Take 50 mg by mouth once daily.      [] RSV, preF A and preF B,PF, (ABRYSVO) 120 mcg/0.5 mL SolR vaccine Inject 0.5 mLs (120 mcg  total) into the muscle once. for 1 dose 1 each 0    [DISCONTINUED] famotidine (PEPCID) 20 MG tablet Take 1 tablet (20 mg total) by mouth daily as needed for Heartburn. 30 tablet 11    [DISCONTINUED] famotidine (PEPCID) 20 MG tablet Take 1 tablet (20 mg total) by mouth daily as needed for Heartburn. 30 tablet 11    [DISCONTINUED] ondansetron (ZOFRAN-ODT) 8 MG TbDL Take 1 tablet (8 mg total) by mouth every 6 (six) hours as needed (nausea and vomiting). (Patient not taking: Reported on 2025) 30 tablet 1     No facility-administered encounter medications on file as of 2025.         Plan:     Problem List Items Addressed This Visit       Moderate asthma without complication - Primary     - triggers pre-pregnancy included smoking which she is planning to continue cessation  - gio and feno after recovered from delivery  - discussed when to use and purpose of flovent- to begin taking morning and night, reviewed swishing mouth out afterwards  - discussed when to use albuterol and warning signs for asthma flare  - prescribed nebulizer for pt to have at home  - in prior pregnancies had worsening asthma symptoms after delivery         Relevant Medications    albuterol (PROVENTIL) 2.5 mg /3 mL (0.083 %) nebulizer solution    Other Relevant Orders    Spirometry with/without bronchodilator    Fraction of  Nitric Oxide    NEBULIZER FOR HOME USE    NEBULIZER KIT (SUPPLIES) FOR HOME USE    Asthma during pregnancy     - reviewed importance of treating asthma to avoid flare while pregnant          Will try to have testing and follow up scheduled in [place]. If pushing out many months, ok to do testing in [place] and follow up virtually. Thank you Dr. Sandoval for referring Mrs. Tai for evaluation.

## 2025-01-20 ENCOUNTER — PATIENT MESSAGE (OUTPATIENT)
Dept: OBSTETRICS AND GYNECOLOGY | Facility: CLINIC | Age: 35
End: 2025-01-20
Payer: MEDICAID

## 2025-01-20 ENCOUNTER — TELEPHONE (OUTPATIENT)
Dept: OBSTETRICS AND GYNECOLOGY | Facility: CLINIC | Age: 35
End: 2025-01-20
Payer: MEDICAID

## 2025-01-23 ENCOUNTER — TELEPHONE (OUTPATIENT)
Dept: OBSTETRICS AND GYNECOLOGY | Facility: CLINIC | Age: 35
End: 2025-01-23
Payer: MEDICAID

## 2025-01-23 NOTE — TELEPHONE ENCOUNTER
Called pt in regards to scheduling, no answer, however pt already has appointment schedule for Friday with raina Aguirre informing pt of this.

## 2025-01-24 ENCOUNTER — ROUTINE PRENATAL (OUTPATIENT)
Dept: OBSTETRICS AND GYNECOLOGY | Facility: CLINIC | Age: 35
End: 2025-01-24
Payer: MEDICAID

## 2025-01-24 VITALS
BODY MASS INDEX: 43.86 KG/M2 | WEIGHT: 247.56 LBS | DIASTOLIC BLOOD PRESSURE: 87 MMHG | SYSTOLIC BLOOD PRESSURE: 112 MMHG

## 2025-01-24 DIAGNOSIS — O09.93 SUPERVISION OF HIGH RISK PREGNANCY IN THIRD TRIMESTER: Primary | ICD-10-CM

## 2025-01-24 DIAGNOSIS — Z3A.37 37 WEEKS GESTATION OF PREGNANCY: ICD-10-CM

## 2025-01-24 PROCEDURE — 99999 PR PBB SHADOW E&M-EST. PATIENT-LVL III: CPT | Mod: PBBFAC,,, | Performed by: OBSTETRICS & GYNECOLOGY

## 2025-01-24 PROCEDURE — 99213 OFFICE O/P EST LOW 20 MIN: CPT | Mod: TH,S$PBB,, | Performed by: OBSTETRICS & GYNECOLOGY

## 2025-01-24 PROCEDURE — 87653 STREP B DNA AMP PROBE: CPT | Performed by: OBSTETRICS & GYNECOLOGY

## 2025-01-24 PROCEDURE — 99213 OFFICE O/P EST LOW 20 MIN: CPT | Mod: PBBFAC,TH,PN | Performed by: OBSTETRICS & GYNECOLOGY

## 2025-01-24 NOTE — Clinical Note
Can you make her an appt with Genia at [facility] the week of 2/10? I am on call that week. Thanks!

## 2025-01-24 NOTE — PROGRESS NOTES
Good FM. Denies VB, LOF, CTX.   Some increased bloating lately.    /87   Wt 112.3 kg (247 lb 9.2 oz)   BMI 43.86 kg/m²     34 y.o., at 36w4d by Estimated Date of Delivery: 25  Patient Active Problem List   Diagnosis    Pyelonephritis    Bipolar affective disorder in remission    Moderate asthma without complication    Asthma during pregnancy     OB History    Para Term  AB Living   3 2 2     2   SAB IAB Ectopic Multiple Live Births           2      # Outcome Date GA Lbr Murphy/2nd Weight Sex Type Anes PTL Lv   3 Current            2 Term 18 40w1d  3.289 kg (7 lb 4 oz) F Vag-Spont   CRAIG   1 Term 17 39w0d  2.41 kg (5 lb 5 oz) M Vag-Spont   CRAIG       Dating reviewed    Allergies and problem list reviewed and updated    Medical and surgical history reviewed    Prenatal labs reviewed and updated    Physical Exam:  ABD: soft, gravid, nontender    Assessment:  Margy was seen today for routine prenatal visit.    Diagnoses and all orders for this visit:    Supervision of high risk pregnancy in third trimester  -     Group B Streptococcus, PCR    37 weeks gestation of pregnancy        Orders Placed This Encounter   Procedures    Group B Streptococcus, PCR     GBS collected.   3T labs next week.   Labor, ROM and bleeding precautions.   She is not yet interested in IOL.     Follow up in about 1 week (around 2025) for routine OB.

## 2025-01-26 LAB — GROUP B STREPTOCOCCUS, PCR: NEGATIVE

## 2025-01-27 ENCOUNTER — CLINICAL SUPPORT (OUTPATIENT)
Dept: REHABILITATION | Facility: OTHER | Age: 35
End: 2025-01-27
Attending: OBSTETRICS & GYNECOLOGY
Payer: MEDICAID

## 2025-01-27 DIAGNOSIS — M54.30 SCIATIC LEG PAIN: ICD-10-CM

## 2025-01-27 DIAGNOSIS — O99.891 BACK PAIN AFFECTING PREGNANCY IN THIRD TRIMESTER: ICD-10-CM

## 2025-01-27 DIAGNOSIS — M54.9 BACK PAIN AFFECTING PREGNANCY IN THIRD TRIMESTER: ICD-10-CM

## 2025-01-27 PROCEDURE — 97161 PT EVAL LOW COMPLEX 20 MIN: CPT

## 2025-01-27 PROCEDURE — 97110 THERAPEUTIC EXERCISES: CPT

## 2025-01-27 NOTE — PROGRESS NOTES
Outpatient Rehab    Physical Therapy Evaluation    Patient Name: Margy Tai  MRN: [id number]  YOB: 1990  Today's Date: 1/28/2025    Therapy Diagnosis:   Encounter Diagnoses   Name Primary?    Sciatic leg pain     Back pain affecting pregnancy in third trimester      Physician: Lori Sandoval MD    Physician Orders: Eval and Treat  Medical Diagnosis: M54.30 (ICD-10-CM) - Sciatic leg pain; O99.891,M54.9 (ICD-10-CM) - Back pain affecting pregnancy in third trimester    Visit # / Visits Authorized:  1 / 1 (pending)   Date of Evaluation:  1/27/2025   Insurance Authorization Period:  1/8/27  Plan of Care Certification:  1/27/2025 to 3/27/2025      Time In:  12:01 PM  Time Out:  1:02 PM  Total Time:   60 minutes  Total Billable Time: 60 minutes    Precautions     Standard      Subjective   History of Present Illness  Margy is a 34 y.o. female who reports to physical therapy with a chief concern of Low back pain with right sided sciatica. According to the patient's chart, Margy has a past medical history of JAVI (acute kidney injury), Asthma, and Bipolar disorder, unspecified. Margy has no past surgical history on file.    The patient reports a medical diagnosis of M54.30 (ICD-10-CM) - Sciatic leg pain  O99.891,M54.9 (ICD-10-CM) - Back pain affecting pregnancy in third trimester.    Diagnostic tests related to this condition: None.        Dominant Hand: Left  History of Present Condition/Illness: Patient reports low back pain that dates back to 10 years old without any mechanism of injury. Pain is located midline low back and radiates out to her right hip and travels down the posterolateral right thigh past the knee to the top and bottom of right foot, including all toes. She gets tingling down the entire right leg. Patient has never sought treatment until now. She is active working as a  so she moves around a lot which alleviates pain, however on days when she's off and more sedentary  her pain is worst. Aggravating factors include: sitting for longer than 30 minutes; laying down striaght on her back and right side; and being sedentary.    Activities of Daily Living  Social history was obtained from Patient.          Patient Roles: Caregiver for child  Patient Responsibilities: Driving, Laundry, Meal prep, Shopping, Personal ADL, Home management, Health management, Community mobility, Financial management    Previously independent with activities of daily living? Yes     Currently independent with activities of daily living? Yes          Previously independent with instrumental activities of daily living? Yes     Currently independent with instrumental activities of daily living? Yes              Pain     Patient reports a current pain level of 2/10. Pain at best is reported as 2/10. Pain at worst is reported as 10/10.   Location: Midline low back and right upper/lower leg & foot  Clinical Progression (since onset): Worsening  Pain Qualities: Aching, Needle-like, Pressure, Pulling, Radiating, Sharp, Tightness, Throbbing  Pain-Relieving Factors: Stretching, Change in position, Medications - over-the-counter  Pain-Aggravating Factors: Lifting, Lying down, Sitting, Sleeping  Unable to sleep < 3-4 hours      Review of Systems  Patient reports: Night Sweats/Chills, Sleep Disturbance, Weight Gain, and Kidney History  Additional Review of Systems Details: Patient is 36 weeks pregnant     Treatment History  Treatments  Previously Received Treatments: No  Currently Receiving Treatments: No    Living Arrangements  Home Setup  Number of Levels in Home: Two level  Access to Alternate Level of Home: Stairs with rails  Alternate Level Stairs - Number of Steps: 26  Primary Bedroom: 2nd floor        Employment  Patient reports: Does the patient's condition impact their ability to work?  Employment Status: Not employed   Patient not able to work because of pregnancy status.      Past Medical History/Physical  Systems Review:   Margy Tai  has a past medical history of JAVI (acute kidney injury), Asthma, and Bipolar disorder, unspecified.    Margy Tai  has no past surgical history on file.    Margy has a current medication list which includes the following prescription(s): albuterol, albuterol, benzonatate, famotidine, fluticasone propionate, hydrocodone-acetaminophen, loratadine, ondansetron, prenatal vit no.124/iron/folic, and quetiapine.    Review of patient's allergies indicates:   Allergen Reactions    Cyanocobalamin (vitamin b12) Other (See Comments)     Stated that she was foaming at the mouth with muscle tightness     Lamictal [lamotrigine] Hives    Promethazine Hives        Objective   Posture  Patient presents with a Forward and Tilted left head position.     Shoulders are Asymmetric and Rounded. Bilateral scapulae are: Protracted  Pelvic tilt observed: Anterior         Left hip is: ABducted  Left trunk lean, decreased lumbar lordosis    Lower Extremity Sensation  Right Lumbar/Lower Extremity Sensation  Intact: Sharp/Dull Discrimination, Static Two Point Discrimination, Dynamic Two Point Discrimination, Kinesthesia, and Proprioception  Diminished: Light Touch  Right Lumbar/Lower Extremity Sensation Light Touch Comment: Diminished L2-L4 dermatomes  Right Lumbar/Lower Extremity Sensation Stocking Glove Pattern: No                  Right Lower Extremity Reflexes  Patellar, L4: Normal (2+)    Hamstring, L5: Normal (2+)    Achilles, S1: Normal (2+)    Babinski reflex Absent.    Left Lower Extremity Reflexes  Patellar, L4: Normal (2+)     Hamstring, L5: Normal (2+)    Achilles, S1: Normal (2+)    Babinski reflex Absent.        Spinal Mobility  Normal: Cervical  Hypermobile: Thoracic and Lumbosacral  Thoracic Mobility Details: Decreased T4-6 PA  Lumbosacral Mobility Details: Decreased L5-S1 transverse glide    Spinal Muscle Palpation  Right Spinal Muscle Palpation  Abnormal: Lumbar/Sacral  Right Lumbar/Sacral Muscle  Palpation Observations: TTDP L4-S1 spinous processes and right QL       Left Spinal Muscle Palpation  Abnormal: Lumbar/Sacral          Hip Palpation  Right Hip Palpation  Abnormal: Lumbar/Sacral Muscle  Right Lumbar/Sacral Muscle Palpation Observations: TTDP L4-S1 spinous processes and right QL       Left Hip Palpation  Abnormal: Lumbar/Sacral Muscle               Thoracic Range of Motion   Active (deg) Passive (deg) Pain   Flexion  (WFL)  (WFL)     Extension         Right Lateral Flexion         Right Rotation         Left Lateral Flexion         Left Rotation                Thoracic rotation limited 35% bilaterally; extension limited 10%    Lumbar Range of Motion   Active (deg) Passive (deg) Pain   Flexion 60       Extension 20       Right Lateral Flexion 20 (P!) 25 Yes   Right Rotation 55   Yes   Left Lateral Flexion 25 28 Yes   Left Rotation 50   Yes     % Limitations: Lumbar extension 35%; right lateral flexion 40%; left lateral flexion 30%; bilateral rotation 40%      Hip Range of Motion   Right Hip   Active (deg) Passive (deg) Pain   Flexion 115       Extension 0       ABduction 45       ADduction         External Rotation 90/90 30       External Rotation Prone         Internal Rotation 90/90 35       Internal Rotation Prone             Left Hip   Active (deg) Passive (deg) Pain   Flexion 115       Extension 0       ABduction 45       ADduction         External Rotation 90/90 30       External Rotation Prone         Internal Rotation 90/90 30       Internal Rotation Prone                      Lumbar Strength   Strength Pain   Flexion 3+     Extension 3+     Right Lateral Flexion 3+     Left Lateral Flexion 3+     Right Rotation 3+     Left Rotation 3+                  Hip Strength - Planes of Motion   Right Strength Right Pain Left Strength Left  Pain   Flexion (L2) 3+ Yes 3+     Extension 3+ Yes 3+     ABduction 3+   3+     ADduction 3+   3+     Internal Rotation 3+ Yes 3+     External Rotation 3+ Yes 3+          Knee Strength   Right Strength Right Pain Left Strength Left  Pain   Flexion (S2) 4-   4-     Prone Flexion           Extension (L3) 4-   4-            Ankle/Foot Strength - Planes of Motion   Right Strength Right Pain Left Strength Left  Pain   Dorsiflexion (L4) 4-   4-     Plantar Flexion (S1) 4-   4-     Inversion 4-   4-     Eversion 4-   4-     Great Toe Flexion 4-   4-     Great Toe Extension (L5) 4-   4-     Lesser Toes Flexion           Lesser Toes Extension                       Cervical/Thoracic Special Tests  Thoracic Tests  Positive: Slump and Right Repeated Lateral Bending  Negative: Right Repeated Rotation         Lumbar/Pelvic Girdle Special Tests  Lumbar Tests - Repeated  Positive: Right Side Glide  Negative: Flexion, Extension, and Left Side Glide       Lumbar Tests - SLR and Tension  Positive: Right Crossed Straight Leg Raise and Left Crossed Straight Leg Raise  Negative: Right Passive Straight Leg Raise, Left Passive Straight Leg Raise, Right Sural Nerve Bias Straight Leg Raise, Left Sural Nerve Bias Straight Leg Raise, Right Tibial Nerve Bias Straight Leg Raise, Left Tibial Nerve Bias Straight Leg Raise, Right Femoral Nerve Tension, and Left Femoral Nerve Tension       Other Lumbar Tests  Positive: Right Peroneal Nerve Tension  Negative: Left Peroneal Nerve Tension            Pelvic Girdle / Sacrum Tests  Positive: Right Sacroiliac Compression and Left Sacroiliac Compression         Hip Special Tests  Sacroiliac Joint Tests  Positive: Right Compression and Left Compression  Other Hip Special Tests  Negative: Right Femoral Nerve Tension and Left Femoral Nerve Tension                Ambulation Assistance Required  Surface With  Assistive Device Without Assistive Device Details   Level   Independent      Uneven   Independent     Curb   Independent       Stairs Assistance Required   Assistance Level Upper Extremity Support Pattern   Ascending Independent Hand hold     Descending Independent  "Hand hold          Gait Analysis  Gait Pattern: Waddling                   Intake Outcome Measure for FOTO Survey    Therapist reviewed FOTO scores for Margy Tai on 1/27/2025.   FOTO report - see Media section or FOTO account episode details.     Intake Score: 54%    Treatment:  Therapeutic Exercise  Therapeutic Exercise Activity 1: Cat- cow (flexion hold 5") 3 x 10  Therapeutic Exercise Activity 2: Standing hip abduction (Cue TrA) 3 x 10  Therapeutic Exercise Activity 3: Standing marches RTB at knees (Cue TrA) 3 x 10  Therapeutic Exercise Activity 4: Standing lumbar/QL stretch (hanging BL hands off bar)    Patient's spiritual, cultural, and educational needs considered and patient agreeable to plan of care and goals.     Assessment & Plan   Assessment  Margy presents with a condition of Low complexity.   Presentation of Symptoms: Stable  Will Comorbidities Impact Care: No       Functional Limitations: Activity tolerance, Sitting tolerance, Pain with ADLs/IADLs, Disrupted sleep pattern, Bed mobility  Impairments: Activity intolerance, Abnormal or restricted range of motion, Impaired physical strength, Lack of appropriate home exercise program, Pain with functional activity  Personal Factors Affecting Prognosis: Other (Comment)  Other Personal Factors Affecting Prognosis: 36 weeks pregnant    Patient Goal for Therapy (PT): "Decrease low back and right hip pain."  Prognosis: Good  Assessment Details: Upon examination, patient presents with impairments in gross lumbar range of motion that is painful; limited bilateral hip internal/external rotation; decreased lumbar and lower quarter MMT strength limitations, diminished L2-L4 right dermatomes and a positive right Crossed SLR test. These impairments result in activity limitations with prolong sitting, laying down supine/side lying to sleep and prolonged standing which lead to participation restrictions in general ADLs and caretaker related duties.The patient will " benefit from skilled physical therapy to address activity and participation restrictions of above mentioned deficits with a PT program focusing on lumbosacral range of motion and strength, postural restoration, manual therapy and hip and pelvic strengthening and endurance.  Clinical presentation is currently stable due to consistent symptom presentation. Personal factors and comorbidities that may negatively effect plan of care include: patient's pregnancy status (36 weeks). Based on today's evaluation findings and the composite of the patient's presentation, I consider the case to be of low complexity. Prognosis for PT intervention is good. This evaluation is of low complexity due to the stable nature of the patient's presentation as well as the comorbidities and medical factors included in this evaluation. The patient has been educated in the evaluation findings, prognosis, and plan of care, HEP and is in agreement and willing to participate in therapy.     Plan  From a physical therapy perspective, the patient would benefit from: Skilled Rehab Services    Planned therapy interventions include: Therapeutic exercise, Neuromuscular re-education, Therapeutic activities, Manual therapy, ADLs/IADLs, and Gait training.    Planned modalities to include: Electrical stimulation - passive/unattended and Electrical stimulation - attended.        Visit Frequency: 1 times Per Week for 6 Weeks.  Other/tapered frequency details: 1-2 times/ week for 4-6 weeks    This plan was discussed with Patient.   Discussion participants: Agreed Upon Plan of Care             Goals:   Active       Physical Therapy       Physical Therapy Goal       Start:  01/28/25    Expected End:  02/28/25       GOALS: Short Term Goals:  4 weeks  1. Report decreased in pain at worse less than  <   / =  6  /10  to increase tolerance for functional activities.On going  2. Pt will exhibit increase in lumbar and gross bilateral hip strength by 1/2  MMT grade to  increase tolerance for ADL and work activities.On going  3. Pt to tolerate HEP to improve ROM and independence with ADL's.On going  4. Pt to improve gross lumbar range of motion by 25% to allow for improved functional mobility to allow for improvement in IADL's. On going    Long Term Goals: 8 weeks  1. Report decreased in pain at worse less than  <   / =  3  /10  to increase tolerance for functional mobility.  On going  2. Pt to demonstrate ability to independently control and reduce their pain through posture positioning and mechanical movements throughout a typical day.  (approp and ongoing)  3. Pt will exhibit increase gross lumbar and bilateral in strength by MMT 1 grade to increase tolerance for ADL and work activities.On going  4. Pt will report at 59% or less limitation on FOTO Lumbar spine survey  to demonstrate decrease in disability and improvement in back pain.On going  5. Pt to be Independent with HEP to improve ROM and independence with ADL's. On going  6. Pt to improve lumbar range of motion by 75% to allow for improved functional mobility to allow for improvement in IADL's. On going  7. Pt will be able to tolerate sitting for at least 30 minutes with no greater than 2/10 low back pain. On going

## 2025-01-28 ENCOUNTER — LAB VISIT (OUTPATIENT)
Dept: LAB | Facility: HOSPITAL | Age: 35
End: 2025-01-28
Attending: OBSTETRICS & GYNECOLOGY
Payer: MEDICAID

## 2025-01-28 ENCOUNTER — ROUTINE PRENATAL (OUTPATIENT)
Dept: OBSTETRICS AND GYNECOLOGY | Facility: CLINIC | Age: 35
End: 2025-01-28
Payer: MEDICAID

## 2025-01-28 VITALS
DIASTOLIC BLOOD PRESSURE: 78 MMHG | WEIGHT: 251.63 LBS | BODY MASS INDEX: 44.58 KG/M2 | SYSTOLIC BLOOD PRESSURE: 108 MMHG

## 2025-01-28 DIAGNOSIS — O09.93 SUPERVISION OF HIGH RISK PREGNANCY IN THIRD TRIMESTER: ICD-10-CM

## 2025-01-28 DIAGNOSIS — O99.019 ANEMIA DURING PREGNANCY: Primary | ICD-10-CM

## 2025-01-28 DIAGNOSIS — Z3A.37 37 WEEKS GESTATION OF PREGNANCY: ICD-10-CM

## 2025-01-28 DIAGNOSIS — O09.93 SUPERVISION OF HIGH RISK PREGNANCY IN THIRD TRIMESTER: Primary | ICD-10-CM

## 2025-01-28 DIAGNOSIS — O99.891 BACK PAIN AFFECTING PREGNANCY IN THIRD TRIMESTER: ICD-10-CM

## 2025-01-28 DIAGNOSIS — M54.9 BACK PAIN AFFECTING PREGNANCY IN THIRD TRIMESTER: ICD-10-CM

## 2025-01-28 LAB
BASOPHILS # BLD AUTO: 0.02 K/UL (ref 0–0.2)
BASOPHILS NFR BLD: 0.2 % (ref 0–1.9)
DIFFERENTIAL METHOD BLD: ABNORMAL
EOSINOPHIL # BLD AUTO: 0.1 K/UL (ref 0–0.5)
EOSINOPHIL NFR BLD: 0.8 % (ref 0–8)
ERYTHROCYTE [DISTWIDTH] IN BLOOD BY AUTOMATED COUNT: 13.5 % (ref 11.5–14.5)
HCT VFR BLD AUTO: 32.4 % (ref 37–48.5)
HGB BLD-MCNC: 10.7 G/DL (ref 12–16)
HIV 1+2 AB+HIV1 P24 AG SERPL QL IA: NORMAL
IMM GRANULOCYTES # BLD AUTO: 0.04 K/UL (ref 0–0.04)
IMM GRANULOCYTES NFR BLD AUTO: 0.5 % (ref 0–0.5)
LYMPHOCYTES # BLD AUTO: 1.6 K/UL (ref 1–4.8)
LYMPHOCYTES NFR BLD: 18.5 % (ref 18–48)
MCH RBC QN AUTO: 29.6 PG (ref 27–31)
MCHC RBC AUTO-ENTMCNC: 33 G/DL (ref 32–36)
MCV RBC AUTO: 90 FL (ref 82–98)
MONOCYTES # BLD AUTO: 0.4 K/UL (ref 0.3–1)
MONOCYTES NFR BLD: 4.2 % (ref 4–15)
NEUTROPHILS # BLD AUTO: 6.5 K/UL (ref 1.8–7.7)
NEUTROPHILS NFR BLD: 75.8 % (ref 38–73)
NRBC BLD-RTO: 0 /100 WBC
PLATELET # BLD AUTO: 182 K/UL (ref 150–450)
PMV BLD AUTO: 12 FL (ref 9.2–12.9)
RBC # BLD AUTO: 3.61 M/UL (ref 4–5.4)
TREPONEMA PALLIDUM IGG+IGM AB [PRESENCE] IN SERUM OR PLASMA BY IMMUNOASSAY: NONREACTIVE
WBC # BLD AUTO: 8.54 K/UL (ref 3.9–12.7)

## 2025-01-28 PROCEDURE — 87389 HIV-1 AG W/HIV-1&-2 AB AG IA: CPT | Performed by: OBSTETRICS & GYNECOLOGY

## 2025-01-28 PROCEDURE — 86593 SYPHILIS TEST NON-TREP QUANT: CPT | Performed by: OBSTETRICS & GYNECOLOGY

## 2025-01-28 PROCEDURE — 85025 COMPLETE CBC W/AUTO DIFF WBC: CPT | Performed by: OBSTETRICS & GYNECOLOGY

## 2025-01-28 PROCEDURE — 36415 COLL VENOUS BLD VENIPUNCTURE: CPT | Mod: PN | Performed by: OBSTETRICS & GYNECOLOGY

## 2025-01-28 PROCEDURE — 99999 PR PBB SHADOW E&M-EST. PATIENT-LVL III: CPT | Mod: PBBFAC,,, | Performed by: NURSE PRACTITIONER

## 2025-01-28 PROCEDURE — 99213 OFFICE O/P EST LOW 20 MIN: CPT | Mod: TH,S$PBB,, | Performed by: NURSE PRACTITIONER

## 2025-01-28 PROCEDURE — 99213 OFFICE O/P EST LOW 20 MIN: CPT | Mod: PBBFAC,TH,PN | Performed by: NURSE PRACTITIONER

## 2025-01-28 RX ORDER — QUETIAPINE FUMARATE 50 MG/1
TABLET, FILM COATED ORAL
COMMUNITY
Start: 2024-12-17

## 2025-01-28 RX ORDER — DOCUSATE SODIUM 100 MG/1
100 CAPSULE, LIQUID FILLED ORAL 2 TIMES DAILY
Qty: 60 CAPSULE | Refills: 2 | Status: SHIPPED | OUTPATIENT
Start: 2025-01-28

## 2025-01-28 RX ORDER — FERROUS SULFATE 325(65) MG
325 TABLET ORAL
Qty: 30 TABLET | Refills: 11 | Status: SHIPPED | OUTPATIENT
Start: 2025-01-28

## 2025-01-28 NOTE — PROGRESS NOTES
34 y.o. female  at 37w1d   Doing well. Feeling FM, denies VB, leaking of fluid or contractions. Pt reports starting physical therapy for sciatic nerve/back pain yesterday; scheduled twice weekly.    Reviewed GBS results- negative.    Discussed bleeding/contractions/kick counts and labor precautions and how/when to call the office or L&D department. RTC x 1 week with Dr. Sandoval, call or present sooner prn.

## 2025-01-28 NOTE — PROGRESS NOTES
Outpatient Rehab    Physical Therapy Evaluation    Patient Name: Margy Tai  MRN: [id number]  YOB: 1990  Today's Date: 1/28/2025    Therapy Diagnosis:   Encounter Diagnoses   Name Primary?    Sciatic leg pain     Back pain affecting pregnancy in third trimester      Physician: Lori Sandoval MD    Physician Orders: Eval and Treat  Medical Diagnosis: M54.30 (ICD-10-CM) - Sciatic leg pain; O99.891,M54.9 (ICD-10-CM) - Back pain affecting pregnancy in third trimester    Visit # / Visits Authorized:  1 / 1 (pending)   Date of Evaluation:  1/27/2025   Insurance Authorization Period:  1/8/27  Plan of Care Certification:  1/27/2025 to 3/27/2025      Time In:  12:01 PM  Time Out:  1:02 PM  Total Time:   60 minutes  Total Billable Time: 60 minutes    Precautions     Standard      Subjective   History of Present Illness  Margy is a 34 y.o. female who reports to physical therapy with a chief concern of Low back pain with right sided sciatica. According to the patient's chart, Margy has a past medical history of JAVI (acute kidney injury), Asthma, and Bipolar disorder, unspecified. Margy has no past surgical history on file.    The patient reports a medical diagnosis of M54.30 (ICD-10-CM) - Sciatic leg pain  O99.891,M54.9 (ICD-10-CM) - Back pain affecting pregnancy in third trimester.    Diagnostic tests related to this condition: None.        Dominant Hand: Left  History of Present Condition/Illness: Patient reports low back pain that dates back to 10 years old without any mechanism of injury. Pain is located midline low back and radiates out to her right hip and travels down the posterolateral right thigh past the knee to the top and bottom of right foot, including all toes. She gets tingling down the entire right leg. Patient has never sought treatment until now. She is active working as a  so she moves around a lot which alleviates pain, however on days when she's off and more sedentary  her pain is worst. Aggravating factors include: sitting for longer than 30 minutes; laying down striaght on her back and right side; and being sedentary.    Activities of Daily Living  Social history was obtained from Patient.          Patient Roles: Caregiver for child  Patient Responsibilities: Driving, Laundry, Meal prep, Shopping, Personal ADL, Home management, Health management, Community mobility, Financial management    Previously independent with activities of daily living? Yes     Currently independent with activities of daily living? Yes          Previously independent with instrumental activities of daily living? Yes     Currently independent with instrumental activities of daily living? Yes              Pain     Patient reports a current pain level of 2/10. Pain at best is reported as 2/10. Pain at worst is reported as 10/10.   Location: Midline low back and right upper/lower leg & foot  Clinical Progression (since onset): Worsening  Pain Qualities: Aching, Needle-like, Pressure, Pulling, Radiating, Sharp, Tightness, Throbbing  Pain-Relieving Factors: Stretching, Change in position, Medications - over-the-counter  Pain-Aggravating Factors: Lifting, Lying down, Sitting, Sleeping  Unable to sleep < 3-4 hours      Review of Systems  Patient reports: Night Sweats/Chills, Sleep Disturbance, Weight Gain, and Kidney History  Additional Review of Systems Details: Patient is 36 weeks pregnant     Treatment History  Treatments  Previously Received Treatments: No  Currently Receiving Treatments: No    Living Arrangements  Home Setup  Number of Levels in Home: Two level  Access to Alternate Level of Home: Stairs with rails  Alternate Level Stairs - Number of Steps: 26  Primary Bedroom: 2nd floor        Employment  Patient reports: Does the patient's condition impact their ability to work?  Employment Status: Not employed   Patient not able to work because of pregnancy status.      Past Medical History/Physical  Systems Review:   Margy Tai  has a past medical history of JAVI (acute kidney injury), Asthma, and Bipolar disorder, unspecified.    Margy Tai  has no past surgical history on file.    Margy has a current medication list which includes the following prescription(s): albuterol, albuterol, benzonatate, famotidine, fluticasone propionate, hydrocodone-acetaminophen, loratadine, ondansetron, prenatal vit no.124/iron/folic, and quetiapine.    Review of patient's allergies indicates:   Allergen Reactions    Cyanocobalamin (vitamin b12) Other (See Comments)     Stated that she was foaming at the mouth with muscle tightness     Lamictal [lamotrigine] Hives    Promethazine Hives        Objective   Posture  Patient presents with a Forward and Tilted left head position.     Shoulders are Asymmetric and Rounded. Bilateral scapulae are: Protracted  Pelvic tilt observed: Anterior         Left hip is: ABducted  Left trunk lean, decreased lumbar lordosis    Lower Extremity Sensation  Right Lumbar/Lower Extremity Sensation  Intact: Sharp/Dull Discrimination, Static Two Point Discrimination, Dynamic Two Point Discrimination, Kinesthesia, and Proprioception  Diminished: Light Touch  Right Lumbar/Lower Extremity Sensation Light Touch Comment: Diminished L2-L4 dermatomes  Right Lumbar/Lower Extremity Sensation Stocking Glove Pattern: No                  Right Lower Extremity Reflexes  Patellar, L4: Normal (2+)    Hamstring, L5: Normal (2+)    Achilles, S1: Normal (2+)    Babinski reflex Absent.    Left Lower Extremity Reflexes  Patellar, L4: Normal (2+)     Hamstring, L5: Normal (2+)    Achilles, S1: Normal (2+)    Babinski reflex Absent.        Spinal Mobility  Normal: Cervical  Hypermobile: Thoracic and Lumbosacral  Thoracic Mobility Details: Decreased T4-6 PA  Lumbosacral Mobility Details: Decreased L5-S1 transverse glide    Spinal Muscle Palpation  Right Spinal Muscle Palpation  Abnormal: Lumbar/Sacral  Right Lumbar/Sacral Muscle  Palpation Observations: TTDP L4-S1 spinous processes and right QL       Left Spinal Muscle Palpation  Abnormal: Lumbar/Sacral          Hip Palpation  Right Hip Palpation  Abnormal: Lumbar/Sacral Muscle  Right Lumbar/Sacral Muscle Palpation Observations: TTDP L4-S1 spinous processes and right QL       Left Hip Palpation  Abnormal: Lumbar/Sacral Muscle               Thoracic Range of Motion   Active (deg) Passive (deg) Pain   Flexion  (WFL)  (WFL)     Extension         Right Lateral Flexion         Right Rotation         Left Lateral Flexion         Left Rotation                Thoracic rotation limited 35% bilaterally; extension limited 10%    Lumbar Range of Motion   Active (deg) Passive (deg) Pain   Flexion 60       Extension 20       Right Lateral Flexion 20 (P!) 25 Yes   Right Rotation 55   Yes   Left Lateral Flexion 25 28 Yes   Left Rotation 50   Yes     % Limitations: Lumbar extension 35%; right lateral flexion 40%; left lateral flexion 30%; bilateral rotation 40%      Hip Range of Motion   Right Hip   Active (deg) Passive (deg) Pain   Flexion 115       Extension 0       ABduction 45       ADduction         External Rotation 90/90 30       External Rotation Prone         Internal Rotation 90/90 35       Internal Rotation Prone             Left Hip   Active (deg) Passive (deg) Pain   Flexion 115       Extension 0       ABduction 45       ADduction         External Rotation 90/90 30       External Rotation Prone         Internal Rotation 90/90 30       Internal Rotation Prone                      Lumbar Strength   Strength Pain   Flexion 3+     Extension 3+     Right Lateral Flexion 3+     Left Lateral Flexion 3+     Right Rotation 3+     Left Rotation 3+                  Hip Strength - Planes of Motion   Right Strength Right Pain Left Strength Left  Pain   Flexion (L2) 3+ Yes 3+     Extension 3+ Yes 3+     ABduction 3+   3+     ADduction 3+   3+     Internal Rotation 3+ Yes 3+     External Rotation 3+ Yes 3+          Knee Strength   Right Strength Right Pain Left Strength Left  Pain   Flexion (S2) 4-   4-     Prone Flexion           Extension (L3) 4-   4-            Ankle/Foot Strength - Planes of Motion   Right Strength Right Pain Left Strength Left  Pain   Dorsiflexion (L4) 4-   4-     Plantar Flexion (S1) 4-   4-     Inversion 4-   4-     Eversion 4-   4-     Great Toe Flexion 4-   4-     Great Toe Extension (L5) 4-   4-     Lesser Toes Flexion           Lesser Toes Extension                       Cervical/Thoracic Special Tests  Thoracic Tests  Positive: Slump and Right Repeated Lateral Bending  Negative: Right Repeated Rotation         Lumbar/Pelvic Girdle Special Tests  Lumbar Tests - Repeated  Positive: Right Side Glide  Negative: Flexion, Extension, and Left Side Glide       Lumbar Tests - SLR and Tension  Positive: Right Crossed Straight Leg Raise and Left Crossed Straight Leg Raise  Negative: Right Passive Straight Leg Raise, Left Passive Straight Leg Raise, Right Sural Nerve Bias Straight Leg Raise, Left Sural Nerve Bias Straight Leg Raise, Right Tibial Nerve Bias Straight Leg Raise, Left Tibial Nerve Bias Straight Leg Raise, Right Femoral Nerve Tension, and Left Femoral Nerve Tension       Other Lumbar Tests  Positive: Right Peroneal Nerve Tension  Negative: Left Peroneal Nerve Tension            Pelvic Girdle / Sacrum Tests  Positive: Right Sacroiliac Compression and Left Sacroiliac Compression         Hip Special Tests  Sacroiliac Joint Tests  Positive: Right Compression and Left Compression  Other Hip Special Tests  Negative: Right Femoral Nerve Tension and Left Femoral Nerve Tension                Ambulation Assistance Required  Surface With  Assistive Device Without Assistive Device Details   Level   Independent      Uneven   Independent     Curb   Independent       Stairs Assistance Required   Assistance Level Upper Extremity Support Pattern   Ascending Independent Hand hold     Descending Independent  "Hand hold          Gait Analysis  Gait Pattern: Waddling                   Intake Outcome Measure for FOTO Survey    Therapist reviewed FOTO scores for Margy Tai on 1/27/2025.   FOTO report - see Media section or FOTO account episode details.     Intake Score: 54%    Treatment:  Therapeutic Exercise  Therapeutic Exercise Activity 1: Cat- cow (flexion hold 5") 3 x 10  Therapeutic Exercise Activity 2: Standing hip abduction (Cue TrA) 3 x 10  Therapeutic Exercise Activity 3: Standing marches RTB at knees (Cue TrA) 3 x 10  Therapeutic Exercise Activity 4: Standing lumbar/QL stretch (hanging BL hands off bar)    Patient's spiritual, cultural, and educational needs considered and patient agreeable to plan of care and goals.     Assessment & Plan   Assessment  Margy presents with a condition of Low complexity.   Presentation of Symptoms: Stable  Will Comorbidities Impact Care: No       Functional Limitations: Activity tolerance, Sitting tolerance, Pain with ADLs/IADLs, Disrupted sleep pattern, Bed mobility  Impairments: Activity intolerance, Abnormal or restricted range of motion, Impaired physical strength, Lack of appropriate home exercise program, Pain with functional activity  Personal Factors Affecting Prognosis: Other (Comment)  Other Personal Factors Affecting Prognosis: 36 weeks pregnant    Patient Goal for Therapy (PT): "Decrease low back and right hip pain."  Prognosis: Good  Assessment Details: Upon examination, patient presents with impairments in gross lumbar range of motion that is painful; limited bilateral hip internal/external rotation; decreased lumbar and lower quarter MMT strength limitations, diminished L2-L4 right dermatomes and a positive right Crossed SLR test. These impairments result in activity limitations with prolong sitting, laying down supine/side lying to sleep and prolonged standing which lead to participation restrictions in general ADLs and caretaker related duties.The patient will " benefit from skilled physical therapy to address activity and participation restrictions of above mentioned deficits with a PT program focusing on lumbosacral range of motion and strength, postural restoration, manual therapy and hip and pelvic strengthening and endurance.  Clinical presentation is currently stable due to consistent symptom presentation. Personal factors and comorbidities that may negatively effect plan of care include: patient's pregnancy status (36 weeks). Based on today's evaluation findings and the composite of the patient's presentation, I consider the case to be of low complexity. Prognosis for PT intervention is good. This evaluation is of low complexity due to the stable nature of the patient's presentation as well as the comorbidities and medical factors included in this evaluation. The patient has been educated in the evaluation findings, prognosis, and plan of care, HEP and is in agreement and willing to participate in therapy.     Plan  From a physical therapy perspective, the patient would benefit from: Skilled Rehab Services    Planned therapy interventions include: Therapeutic exercise, Neuromuscular re-education, Therapeutic activities, Manual therapy, ADLs/IADLs, and Gait training.    Planned modalities to include: Electrical stimulation - passive/unattended and Electrical stimulation - attended.        Visit Frequency: 1 times Per Week for 6 Weeks.  Other/tapered frequency details: 1-2 times/ week for 4-6 weeks    This plan was discussed with Patient.   Discussion participants: Agreed Upon Plan of Care             Goals:   Active       Physical Therapy       Physical Therapy Goal       Start:  01/28/25    Expected End:  02/28/25       GOALS: Short Term Goals:  4 weeks  1. Report decreased in pain at worse less than  <   / =  6  /10  to increase tolerance for functional activities.On going  2. Pt will exhibit increase in lumbar and gross bilateral hip strength by 1/2  MMT grade to  increase tolerance for ADL and work activities.On going  3. Pt to tolerate HEP to improve ROM and independence with ADL's.On going  4. Pt to improve gross lumbar range of motion by 25% to allow for improved functional mobility to allow for improvement in IADL's. On going    Long Term Goals: 8 weeks  1. Report decreased in pain at worse less than  <   / =  3  /10  to increase tolerance for functional mobility.  On going  2. Pt to demonstrate ability to independently control and reduce their pain through posture positioning and mechanical movements throughout a typical day.  (approp and ongoing)  3. Pt will exhibit increase gross lumbar and bilateral in strength by MMT 1 grade to increase tolerance for ADL and work activities.On going  4. Pt will report at 59% or less limitation on FOTO Lumbar spine survey  to demonstrate decrease in disability and improvement in back pain.On going  5. Pt to be Independent with HEP to improve ROM and independence with ADL's. On going  6. Pt to improve lumbar range of motion by 75% to allow for improved functional mobility to allow for improvement in IADL's. On going  7. Pt will be able to tolerate sitting for at least 30 minutes with no greater than 2/10 low back pain. On going

## 2025-01-29 ENCOUNTER — PATIENT MESSAGE (OUTPATIENT)
Dept: OBSTETRICS AND GYNECOLOGY | Facility: CLINIC | Age: 35
End: 2025-01-29
Payer: MEDICAID

## 2025-02-04 ENCOUNTER — ROUTINE PRENATAL (OUTPATIENT)
Dept: OBSTETRICS AND GYNECOLOGY | Facility: CLINIC | Age: 35
End: 2025-02-04
Payer: MEDICAID

## 2025-02-04 VITALS
WEIGHT: 253.06 LBS | DIASTOLIC BLOOD PRESSURE: 82 MMHG | BODY MASS INDEX: 44.83 KG/M2 | SYSTOLIC BLOOD PRESSURE: 124 MMHG

## 2025-02-04 DIAGNOSIS — Z3A.38 38 WEEKS GESTATION OF PREGNANCY: ICD-10-CM

## 2025-02-04 DIAGNOSIS — Z34.90 ENCOUNTER FOR ELECTIVE INDUCTION OF LABOR: ICD-10-CM

## 2025-02-04 DIAGNOSIS — O09.93 SUPERVISION OF HIGH RISK PREGNANCY IN THIRD TRIMESTER: Primary | ICD-10-CM

## 2025-02-04 PROCEDURE — 99213 OFFICE O/P EST LOW 20 MIN: CPT | Mod: PBBFAC,TH,PN | Performed by: OBSTETRICS & GYNECOLOGY

## 2025-02-04 PROCEDURE — 99999 PR PBB SHADOW E&M-EST. PATIENT-LVL III: CPT | Mod: PBBFAC,,, | Performed by: OBSTETRICS & GYNECOLOGY

## 2025-02-04 NOTE — PROGRESS NOTES
Good FM. Denies VB, LOF, CTX.     /82   Wt 114.8 kg (253 lb 1.4 oz)   BMI 44.83 kg/m²     34 y.o., at 38w1d by Estimated Date of Delivery: 25  Patient Active Problem List   Diagnosis    Pyelonephritis    Bipolar affective disorder in remission    Moderate asthma without complication    Asthma during pregnancy     OB History    Para Term  AB Living   3 2 2     2   SAB IAB Ectopic Multiple Live Births           2      # Outcome Date GA Lbr Murphy/2nd Weight Sex Type Anes PTL Lv   3 Current            2 Term 18 40w1d  3.289 kg (7 lb 4 oz) F Vag-Spont   CRAIG   1 Term 17 39w0d  2.41 kg (5 lb 5 oz) M Vag-Spont   CRAIG       Dating reviewed    Allergies and problem list reviewed and updated    Medical and surgical history reviewed    Prenatal labs reviewed and updated    Physical Exam:  ABD: soft, gravid, nontender    Assessment:  Margy was seen today for routine prenatal visit.    Diagnoses and all orders for this visit:    Supervision of high risk pregnancy in third trimester  -     IP OB Labor Induction; Future    38 weeks gestation of pregnancy    Encounter for elective induction of labor  -     IP OB Labor Induction; Future        Orders Placed This Encounter   Procedures    IP OB Labor Induction     L&D consents signed.  Discussed IOL on ROSARIO if undelivered by then.   Labor, ROM and bleeding precautions.     Follow up in about 1 week (around 2025) for routine OB.

## 2025-02-10 ENCOUNTER — ROUTINE PRENATAL (OUTPATIENT)
Dept: OBSTETRICS AND GYNECOLOGY | Facility: CLINIC | Age: 35
End: 2025-02-10
Payer: MEDICAID

## 2025-02-10 VITALS
DIASTOLIC BLOOD PRESSURE: 85 MMHG | SYSTOLIC BLOOD PRESSURE: 115 MMHG | BODY MASS INDEX: 44.31 KG/M2 | WEIGHT: 250.13 LBS

## 2025-02-10 DIAGNOSIS — O09.93 SUPERVISION OF HIGH RISK PREGNANCY IN THIRD TRIMESTER: Primary | ICD-10-CM

## 2025-02-10 DIAGNOSIS — Z34.90 ENCOUNTER FOR ELECTIVE INDUCTION OF LABOR: ICD-10-CM

## 2025-02-10 DIAGNOSIS — Z3A.39 39 WEEKS GESTATION OF PREGNANCY: ICD-10-CM

## 2025-02-10 PROCEDURE — 99999 PR PBB SHADOW E&M-EST. PATIENT-LVL III: CPT | Mod: PBBFAC,,, | Performed by: NURSE PRACTITIONER

## 2025-02-10 PROCEDURE — 99213 OFFICE O/P EST LOW 20 MIN: CPT | Mod: PBBFAC,TH,PN | Performed by: NURSE PRACTITIONER

## 2025-02-10 PROCEDURE — 99213 OFFICE O/P EST LOW 20 MIN: CPT | Mod: TH,S$PBB,, | Performed by: NURSE PRACTITIONER

## 2025-02-10 NOTE — PROGRESS NOTES
34 y.o. female  at 39w0d   Doing well. Feeling FM, denies VB, leaking of fluid or contractions. Pt reports decrease in movement, states feels baby moving throughout the day though.     Induction scheduled  at 0000.     Discussed bleeding/contractions/kick counts and labor precautions and how/when to call the office or L&D department. RTC x 1 week, call or present sooner prn. Schedule 6wk postpartum visit.

## 2025-02-12 ENCOUNTER — CLINICAL SUPPORT (OUTPATIENT)
Dept: REHABILITATION | Facility: OTHER | Age: 35
End: 2025-02-12
Payer: MEDICAID

## 2025-02-12 DIAGNOSIS — M54.9 BACK PAIN AFFECTING PREGNANCY IN THIRD TRIMESTER: Primary | ICD-10-CM

## 2025-02-12 DIAGNOSIS — O99.891 BACK PAIN AFFECTING PREGNANCY IN THIRD TRIMESTER: Primary | ICD-10-CM

## 2025-02-12 PROCEDURE — 97110 THERAPEUTIC EXERCISES: CPT

## 2025-02-12 NOTE — PROGRESS NOTES
Physical Therapy Daily Treatment Note     Name: Margy Tai  Clinic Number: [id number]    Therapy Diagnosis:   Encounter Diagnosis   Name Primary?    Back pain affecting pregnancy in third trimester Yes     Physician: Lori Sandoval MD    Visit Date: 2/12/2025    Physician Orders: Eval and Treat  Medical Diagnosis: M54.30 (ICD-10-CM) - Sciatic leg pain; O99.891,M54.9 (ICD-10-CM) - Back pain affecting pregnancy in third trimester     Visit # / Visits Authorized:  1 /10  Date of Evaluation:  1/27/2025   Insurance Authorization Period:  1/8/27  Plan of Care Certification:  1/27/2025 to 3/27/2025                 Time In:  11:00 am  Time Out:  11:55 am  Total Time:   55 minutes  Total Billable Time: 47 minutes     Precautions: 3rd trimester pregnancy    Subjective     Pt reports: that she has been doing okay overall. Still having a lot of pain with sleeping to her R leg. She had food poisoning last week, so was not able to come.  she was compliant with home exercise program given last session.   Response to previous treatment:good  Functional change: ongoing    Pain: 4/10  Location: right leg      Objective     Updated at Progress Report Unless Specified    Treatment     Margy received the following manual therapy techniques for 12 minutes:   STM to R piriformis and R glut med/max in sidelying    Margy received therapeutic exercises for 35 minutes including:  Seated lumbar FL x 10 reps with focus on breathing  Cat/cow x 2 minutes  Rosette pose x 2 minutes  Sidelying open books L side only x 15 reps  Seated hip adduction with ball 2 x 10 5 second holds  Seated hip bent knee fall out B TB 2 x 10 reps 3 second holds  Seated nerve glide x 15 reps    Margy received hot pack for 8 minutes to R glut area in sidelying with pillows.    Home Exercises and Education Provided     Education provided:   - HEP review  - Progress towards goals     Written Home Exercises Provided: Patient instructed to cont prior HEP.  Exercises were  reviewed and Margy was able to demonstrate them prior to the end of the session.  Margy demonstrated good  understanding of the HEP provided.   .   See EMR under Patient Instructions for exercises provided prior visit.      Assessment   Patient demonstrated good tolerance to gentle mobility and core/hip control activities. Improvement in pain/adverse symptoms by the end of the session.     Margy is progressing well towards her goals and there are no updates to goals at this time. Pt prognosis is Good.     Pt will continue to benefit from skilled outpatient physical therapy to address the deficits listed in the problem list on initial evaluation provide pt/family education and to maximize pt's level of independence in the home and community environment.     Anticipated barriers to physical therapy: 3rd trimester of pregnancy    Pt's spiritual, cultural and educational needs considered and pt agreeable to plan of care and goals.    Goals:  GOALS: Short Term Goals:  4 weeks  1. Report decreased in pain at worse less than  <   / =  6  /10  to increase tolerance for functional activities.On going  2. Pt will exhibit increase in lumbar and gross bilateral hip strength by 1/2  MMT grade to increase tolerance for ADL and work activities.On going  3. Pt to tolerate HEP to improve ROM and independence with ADL's.On going  4. Pt to improve gross lumbar range of motion by 25% to allow for improved functional mobility to allow for improvement in IADL's. On going     Long Term Goals: 8 weeks  1. Report decreased in pain at worse less than  <   / =  3  /10  to increase tolerance for functional mobility.  On going  2. Pt to demonstrate ability to independently control and reduce their pain through posture positioning and mechanical movements throughout a typical day.  (approp and ongoing)  3. Pt will exhibit increase gross lumbar and bilateral in strength by MMT 1 grade to increase tolerance for ADL and work activities.On going  4. Pt  will report at 59% or less limitation on FOTO Lumbar spine survey  to demonstrate decrease in disability and improvement in back pain.On going  5. Pt to be Independent with HEP to improve ROM and independence with ADL's. On going  6. Pt to improve lumbar range of motion by 75% to allow for improved functional mobility to allow for improvement in IADL's. On going  7. Pt will be able to tolerate sitting for at least 30 minutes with no greater than 2/10 low back pain. On going      Shannan Worthy, PT

## 2025-02-16 ENCOUNTER — ANESTHESIA EVENT (OUTPATIENT)
Dept: OBSTETRICS AND GYNECOLOGY | Facility: OTHER | Age: 35
End: 2025-02-16
Payer: MEDICAID

## 2025-02-16 ENCOUNTER — ANESTHESIA (OUTPATIENT)
Dept: OBSTETRICS AND GYNECOLOGY | Facility: OTHER | Age: 35
End: 2025-02-16
Payer: MEDICAID

## 2025-02-16 ENCOUNTER — PATIENT MESSAGE (OUTPATIENT)
Dept: OBSTETRICS AND GYNECOLOGY | Facility: OTHER | Age: 35
End: 2025-02-16
Payer: MEDICAID

## 2025-02-16 ENCOUNTER — HOSPITAL ENCOUNTER (INPATIENT)
Facility: OTHER | Age: 35
LOS: 4 days | Discharge: HOME | End: 2025-02-20
Attending: OBSTETRICS & GYNECOLOGY | Admitting: STUDENT IN AN ORGANIZED HEALTH CARE EDUCATION/TRAINING PROGRAM
Payer: MEDICAID

## 2025-02-16 DIAGNOSIS — O61.9 FAILED INDUCTION OF LABOR, UNSPECIFIED TYPE: ICD-10-CM

## 2025-02-16 DIAGNOSIS — O36.8390 FETAL HEART RATE DECELERATIONS AFFECTING MANAGEMENT OF MOTHER: ICD-10-CM

## 2025-02-16 DIAGNOSIS — Z98.891 STATUS POST PRIMARY LOW TRANSVERSE CESAREAN SECTION: ICD-10-CM

## 2025-02-16 DIAGNOSIS — Z34.90 ENCOUNTER FOR INDUCTION OF LABOR: Primary | ICD-10-CM

## 2025-02-16 PROCEDURE — 11000001 HC ACUTE MED/SURG PRIVATE ROOM

## 2025-02-17 ENCOUNTER — ANESTHESIA (OUTPATIENT)
Dept: OBSTETRICS AND GYNECOLOGY | Facility: OTHER | Age: 35
End: 2025-02-17
Payer: MEDICAID

## 2025-02-17 ENCOUNTER — ANESTHESIA EVENT (OUTPATIENT)
Dept: OBSTETRICS AND GYNECOLOGY | Facility: OTHER | Age: 35
End: 2025-02-17
Payer: MEDICAID

## 2025-02-17 PROBLEM — O13.3 GESTATIONAL HYPERTENSION, THIRD TRIMESTER: Status: ACTIVE | Noted: 2025-02-17

## 2025-02-17 PROBLEM — Z34.90 ENCOUNTER FOR INDUCTION OF LABOR: Status: ACTIVE | Noted: 2025-02-17

## 2025-02-17 PROBLEM — O99.213 OBESITY AFFECTING PREGNANCY IN THIRD TRIMESTER: Status: ACTIVE | Noted: 2025-02-17

## 2025-02-17 LAB
ABO + RH BLD: NORMAL
ALBUMIN SERPL BCP-MCNC: 2.6 G/DL (ref 3.5–5.2)
ALP SERPL-CCNC: 111 U/L (ref 40–150)
ALT SERPL W/O P-5'-P-CCNC: 9 U/L (ref 10–44)
ANION GAP SERPL CALC-SCNC: 11 MMOL/L (ref 8–16)
AST SERPL-CCNC: 13 U/L (ref 10–40)
BASOPHILS # BLD AUTO: 0.02 K/UL (ref 0–0.2)
BASOPHILS NFR BLD: 0.2 % (ref 0–1.9)
BILIRUB SERPL-MCNC: 0.2 MG/DL (ref 0.1–1)
BLD GP AB SCN CELLS X3 SERPL QL: NORMAL
BUN SERPL-MCNC: 11 MG/DL (ref 6–20)
CALCIUM SERPL-MCNC: 9.5 MG/DL (ref 8.7–10.5)
CHLORIDE SERPL-SCNC: 105 MMOL/L (ref 95–110)
CO2 SERPL-SCNC: 20 MMOL/L (ref 23–29)
CREAT SERPL-MCNC: 0.6 MG/DL (ref 0.5–1.4)
CREAT UR-MCNC: 54.8 MG/DL (ref 15–325)
DIFFERENTIAL METHOD BLD: ABNORMAL
EOSINOPHIL # BLD AUTO: 0.1 K/UL (ref 0–0.5)
EOSINOPHIL NFR BLD: 0.7 % (ref 0–8)
ERYTHROCYTE [DISTWIDTH] IN BLOOD BY AUTOMATED COUNT: 13.8 % (ref 11.5–14.5)
EST. GFR  (NO RACE VARIABLE): >60 ML/MIN/1.73 M^2
GLUCOSE SERPL-MCNC: 116 MG/DL (ref 70–110)
HCT VFR BLD AUTO: 32.2 % (ref 37–48.5)
HGB BLD-MCNC: 10.9 G/DL (ref 12–16)
IMM GRANULOCYTES # BLD AUTO: 0.05 K/UL (ref 0–0.04)
IMM GRANULOCYTES NFR BLD AUTO: 0.6 % (ref 0–0.5)
LYMPHOCYTES # BLD AUTO: 1.9 K/UL (ref 1–4.8)
LYMPHOCYTES NFR BLD: 22.4 % (ref 18–48)
MCH RBC QN AUTO: 29.5 PG (ref 27–31)
MCHC RBC AUTO-ENTMCNC: 33.9 G/DL (ref 32–36)
MCV RBC AUTO: 87 FL (ref 82–98)
MONOCYTES # BLD AUTO: 0.5 K/UL (ref 0.3–1)
MONOCYTES NFR BLD: 6 % (ref 4–15)
NEUTROPHILS # BLD AUTO: 6 K/UL (ref 1.8–7.7)
NEUTROPHILS NFR BLD: 70.1 % (ref 38–73)
NRBC BLD-RTO: 0 /100 WBC
PLATELET # BLD AUTO: 243 K/UL (ref 150–450)
PMV BLD AUTO: 11.2 FL (ref 9.2–12.9)
POTASSIUM SERPL-SCNC: 4.3 MMOL/L (ref 3.5–5.1)
PROT SERPL-MCNC: 6.7 G/DL (ref 6–8.4)
PROT UR-MCNC: <7 MG/DL (ref 0–15)
PROT/CREAT UR: NORMAL MG/G{CREAT} (ref 0–0.2)
RBC # BLD AUTO: 3.69 M/UL (ref 4–5.4)
SODIUM SERPL-SCNC: 136 MMOL/L (ref 136–145)
TREPONEMA PALLIDUM IGG+IGM AB [PRESENCE] IN SERUM OR PLASMA BY IMMUNOASSAY: NONREACTIVE
WBC # BLD AUTO: 8.56 K/UL (ref 3.9–12.7)

## 2025-02-17 PROCEDURE — 25000003 PHARM REV CODE 250

## 2025-02-17 PROCEDURE — 59200 INSERT CERVICAL DILATOR: CPT

## 2025-02-17 PROCEDURE — 63600175 PHARM REV CODE 636 W HCPCS

## 2025-02-17 PROCEDURE — 27200710 HC EPIDURAL INFUSION PUMP SET

## 2025-02-17 PROCEDURE — 84156 ASSAY OF PROTEIN URINE: CPT

## 2025-02-17 PROCEDURE — 86593 SYPHILIS TEST NON-TREP QUANT: CPT

## 2025-02-17 PROCEDURE — 11000001 HC ACUTE MED/SURG PRIVATE ROOM

## 2025-02-17 PROCEDURE — 62326 NJX INTERLAMINAR LMBR/SAC: CPT

## 2025-02-17 PROCEDURE — 85025 COMPLETE CBC W/AUTO DIFF WBC: CPT

## 2025-02-17 PROCEDURE — 27000181 HC CABLE, IUPC

## 2025-02-17 PROCEDURE — 86900 BLOOD TYPING SEROLOGIC ABO: CPT

## 2025-02-17 PROCEDURE — C1751 CATH, INF, PER/CENT/MIDLINE: HCPCS

## 2025-02-17 PROCEDURE — 51702 INSERT TEMP BLADDER CATH: CPT

## 2025-02-17 PROCEDURE — 80053 COMPREHEN METABOLIC PANEL: CPT

## 2025-02-17 RX ORDER — MISOPROSTOL 200 UG/1
800 TABLET ORAL ONCE AS NEEDED
Status: DISCONTINUED | OUTPATIENT
Start: 2025-02-17 | End: 2025-02-18

## 2025-02-17 RX ORDER — SIMETHICONE 80 MG
1 TABLET,CHEWABLE ORAL 4 TIMES DAILY PRN
Status: DISCONTINUED | OUTPATIENT
Start: 2025-02-17 | End: 2025-02-18

## 2025-02-17 RX ORDER — OXYTOCIN-SODIUM CHLORIDE 0.9% IV SOLN 30 UNIT/500ML 30-0.9/5 UT/ML-%
95 SOLUTION INTRAVENOUS CONTINUOUS PRN
Status: DISCONTINUED | OUTPATIENT
Start: 2025-02-17 | End: 2025-02-18

## 2025-02-17 RX ORDER — TRANEXAMIC ACID 10 MG/ML
1000 INJECTION, SOLUTION INTRAVENOUS EVERY 30 MIN PRN
Status: DISCONTINUED | OUTPATIENT
Start: 2025-02-17 | End: 2025-02-18

## 2025-02-17 RX ORDER — OXYTOCIN-SODIUM CHLORIDE 0.9% IV SOLN 30 UNIT/500ML 30-0.9/5 UT/ML-%
10 SOLUTION INTRAVENOUS ONCE AS NEEDED
Status: DISCONTINUED | OUTPATIENT
Start: 2025-02-17 | End: 2025-02-18

## 2025-02-17 RX ORDER — PHENYLEPHRINE HYDROCHLORIDE 10 MG/ML
INJECTION INTRAVENOUS
Status: DISCONTINUED | OUTPATIENT
Start: 2025-02-17 | End: 2025-02-18

## 2025-02-17 RX ORDER — ALBUTEROL SULFATE 0.83 MG/ML
2.5 SOLUTION RESPIRATORY (INHALATION) EVERY 6 HOURS PRN
Status: DISCONTINUED | OUTPATIENT
Start: 2025-02-17 | End: 2025-02-20 | Stop reason: HOSPADM

## 2025-02-17 RX ORDER — MISOPROSTOL 100 MCG
25 TABLET ORAL ONCE
Status: COMPLETED | OUTPATIENT
Start: 2025-02-17 | End: 2025-02-17

## 2025-02-17 RX ORDER — ACETAMINOPHEN 500 MG
1000 TABLET ORAL ONCE
Status: COMPLETED | OUTPATIENT
Start: 2025-02-17 | End: 2025-02-17

## 2025-02-17 RX ORDER — FAMOTIDINE 10 MG/ML
20 INJECTION INTRAVENOUS 2 TIMES DAILY
Status: DISCONTINUED | OUTPATIENT
Start: 2025-02-17 | End: 2025-02-19

## 2025-02-17 RX ORDER — QUETIAPINE FUMARATE 25 MG/1
50 TABLET, FILM COATED ORAL NIGHTLY
Status: DISCONTINUED | OUTPATIENT
Start: 2025-02-17 | End: 2025-02-20 | Stop reason: HOSPADM

## 2025-02-17 RX ORDER — MISOPROSTOL 100 MCG
25 TABLET ORAL ONCE
Status: DISCONTINUED | OUTPATIENT
Start: 2025-02-17 | End: 2025-02-17

## 2025-02-17 RX ORDER — FENTANYL/BUPIVACAINE/NS/PF 2MCG/ML-.1
PLASTIC BAG, INJECTION (ML) INJECTION CONTINUOUS PRN
Status: DISCONTINUED | OUTPATIENT
Start: 2025-02-17 | End: 2025-02-18

## 2025-02-17 RX ORDER — OXYTOCIN-SODIUM CHLORIDE 0.9% IV SOLN 30 UNIT/500ML 30-0.9/5 UT/ML-%
0-32 SOLUTION INTRAVENOUS CONTINUOUS
Status: DISCONTINUED | OUTPATIENT
Start: 2025-02-17 | End: 2025-02-18

## 2025-02-17 RX ORDER — LIDOCAINE HYDROCHLORIDE AND EPINEPHRINE 15; 5 MG/ML; UG/ML
INJECTION, SOLUTION EPIDURAL
Status: DISCONTINUED | OUTPATIENT
Start: 2025-02-17 | End: 2025-02-18

## 2025-02-17 RX ORDER — METOCLOPRAMIDE 10 MG/1
10 TABLET ORAL ONCE
Status: DISCONTINUED | OUTPATIENT
Start: 2025-02-18 | End: 2025-02-17

## 2025-02-17 RX ORDER — OXYTOCIN 10 [USP'U]/ML
10 INJECTION, SOLUTION INTRAMUSCULAR; INTRAVENOUS
Status: DISCONTINUED | OUTPATIENT
Start: 2025-02-17 | End: 2025-02-18

## 2025-02-17 RX ORDER — DIPHENOXYLATE HYDROCHLORIDE AND ATROPINE SULFATE 2.5; .025 MG/1; MG/1
2 TABLET ORAL EVERY 6 HOURS PRN
Status: DISCONTINUED | OUTPATIENT
Start: 2025-02-17 | End: 2025-02-18

## 2025-02-17 RX ORDER — CARBOPROST TROMETHAMINE 250 UG/ML
250 INJECTION, SOLUTION INTRAMUSCULAR
Status: DISCONTINUED | OUTPATIENT
Start: 2025-02-17 | End: 2025-02-18

## 2025-02-17 RX ORDER — SODIUM CHLORIDE, SODIUM LACTATE, POTASSIUM CHLORIDE, CALCIUM CHLORIDE 600; 310; 30; 20 MG/100ML; MG/100ML; MG/100ML; MG/100ML
INJECTION, SOLUTION INTRAVENOUS CONTINUOUS
Status: DISCONTINUED | OUTPATIENT
Start: 2025-02-17 | End: 2025-02-18

## 2025-02-17 RX ORDER — LIDOCAINE HYDROCHLORIDE 10 MG/ML
10 INJECTION, SOLUTION INFILTRATION; PERINEURAL ONCE AS NEEDED
Status: DISCONTINUED | OUTPATIENT
Start: 2025-02-17 | End: 2025-02-18

## 2025-02-17 RX ORDER — FENTANYL/BUPIVACAINE/NS/PF 2MCG/ML-.1
PLASTIC BAG, INJECTION (ML) INJECTION
Status: COMPLETED
Start: 2025-02-17 | End: 2025-02-17

## 2025-02-17 RX ORDER — CALCIUM CARBONATE 200(500)MG
500 TABLET,CHEWABLE ORAL 3 TIMES DAILY PRN
Status: DISCONTINUED | OUTPATIENT
Start: 2025-02-17 | End: 2025-02-18

## 2025-02-17 RX ORDER — METHYLERGONOVINE MALEATE 0.2 MG/ML
200 INJECTION INTRAVENOUS ONCE AS NEEDED
Status: DISCONTINUED | OUTPATIENT
Start: 2025-02-17 | End: 2025-02-18

## 2025-02-17 RX ORDER — DIPHENHYDRAMINE HCL 25 MG
25 CAPSULE ORAL ONCE
Status: COMPLETED | OUTPATIENT
Start: 2025-02-18 | End: 2025-02-18

## 2025-02-17 RX ORDER — OXYTOCIN-SODIUM CHLORIDE 0.9% IV SOLN 30 UNIT/500ML 30-0.9/5 UT/ML-%
95 SOLUTION INTRAVENOUS ONCE AS NEEDED
Status: DISCONTINUED | OUTPATIENT
Start: 2025-02-17 | End: 2025-02-18

## 2025-02-17 RX ORDER — ACETAMINOPHEN 325 MG/1
650 TABLET ORAL EVERY 6 HOURS PRN
Status: DISCONTINUED | OUTPATIENT
Start: 2025-02-17 | End: 2025-02-18

## 2025-02-17 RX ORDER — METOCLOPRAMIDE 10 MG/1
10 TABLET ORAL ONCE
Status: COMPLETED | OUTPATIENT
Start: 2025-02-18 | End: 2025-02-18

## 2025-02-17 RX ORDER — ONDANSETRON 8 MG/1
8 TABLET, ORALLY DISINTEGRATING ORAL EVERY 8 HOURS PRN
Status: DISCONTINUED | OUTPATIENT
Start: 2025-02-17 | End: 2025-02-18

## 2025-02-17 RX ORDER — OXYTOCIN 10 [USP'U]/ML
10 INJECTION, SOLUTION INTRAMUSCULAR; INTRAVENOUS ONCE AS NEEDED
Status: DISCONTINUED | OUTPATIENT
Start: 2025-02-17 | End: 2025-02-18

## 2025-02-17 RX ORDER — CEFAZOLIN 2 G/1
2 INJECTION, POWDER, FOR SOLUTION INTRAMUSCULAR; INTRAVENOUS ONCE AS NEEDED
Status: COMPLETED | OUTPATIENT
Start: 2025-02-17 | End: 2025-02-18

## 2025-02-17 RX ORDER — SODIUM CHLORIDE 9 MG/ML
INJECTION, SOLUTION INTRAVENOUS
Status: DISCONTINUED | OUTPATIENT
Start: 2025-02-17 | End: 2025-02-18

## 2025-02-17 RX ORDER — DIPHENHYDRAMINE HCL 25 MG
25 CAPSULE ORAL ONCE
Status: DISCONTINUED | OUTPATIENT
Start: 2025-02-18 | End: 2025-02-17

## 2025-02-17 RX ADMIN — FAMOTIDINE 20 MG: 10 INJECTION, SOLUTION INTRAVENOUS at 04:02

## 2025-02-17 RX ADMIN — LIDOCAINE HYDROCHLORIDE,EPINEPHRINE BITARTRATE 3 ML: 15; .005 INJECTION, SOLUTION EPIDURAL; INFILTRATION; INTRACAUDAL; PERINEURAL at 10:02

## 2025-02-17 RX ADMIN — Medication 4 MILLI-UNITS/MIN: at 06:02

## 2025-02-17 RX ADMIN — ACETAMINOPHEN 650 MG: 325 TABLET, FILM COATED ORAL at 02:02

## 2025-02-17 RX ADMIN — MISOPROSTOL 25 MCG: 100 TABLET ORAL at 01:02

## 2025-02-17 RX ADMIN — FENTANYL CITRATE 8 ML/HR: 50 INJECTION INTRAMUSCULAR; INTRAVENOUS at 10:02

## 2025-02-17 RX ADMIN — CALCIUM CARBONATE (ANTACID) CHEW TAB 500 MG 500 MG: 500 CHEW TAB at 07:02

## 2025-02-17 RX ADMIN — SODIUM CHLORIDE, POTASSIUM CHLORIDE, SODIUM LACTATE AND CALCIUM CHLORIDE 500 ML: 600; 310; 30; 20 INJECTION, SOLUTION INTRAVENOUS at 09:02

## 2025-02-17 RX ADMIN — SODIUM CHLORIDE, SODIUM LACTATE, POTASSIUM CHLORIDE, AND CALCIUM CHLORIDE 500 ML: .6; .31; .03; .02 INJECTION, SOLUTION INTRAVENOUS at 05:02

## 2025-02-17 RX ADMIN — ACETAMINOPHEN 1000 MG: 500 TABLET, FILM COATED ORAL at 08:02

## 2025-02-17 RX ADMIN — CALCIUM CARBONATE (ANTACID) CHEW TAB 500 MG 500 MG: 500 CHEW TAB at 09:02

## 2025-02-17 RX ADMIN — PHENYLEPHRINE HYDROCHLORIDE 200 MCG: 10 INJECTION INTRAVENOUS at 12:02

## 2025-02-17 RX ADMIN — PHENYLEPHRINE HYDROCHLORIDE 200 MCG: 10 INJECTION INTRAVENOUS at 02:02

## 2025-02-17 RX ADMIN — SODIUM CHLORIDE, SODIUM LACTATE, POTASSIUM CHLORIDE, AND CALCIUM CHLORIDE 500 ML: .6; .31; .03; .02 INJECTION, SOLUTION INTRAVENOUS at 01:02

## 2025-02-17 RX ADMIN — ONDANSETRON 8 MG: 8 TABLET, ORALLY DISINTEGRATING ORAL at 04:02

## 2025-02-17 NOTE — PROGRESS NOTES
"LABOR NOTE    S:  Complaints: No.  Epidural working:  not applicable  Resident to bedside for adam balloon placement    O: /89   Pulse 91   Temp 97.9 °F (36.6 °C) (Oral)   Resp 17   Ht 5' 3" (1.6 m)   Wt 113.5 kg (250 lb 3.6 oz)   SpO2 97%   Breastfeeding No   BMI 44.32 kg/m²     FHT: 140bpm; moderate variability; +accels/-decels; Cat 1 (reassuring)  CTX: q 2-3 minutes, pit @ 4  SVE: 1/50/-4; adma placed    TIMELINE:  0113: vaginal cytotec placed  0530: 0/50/-3  0845: 0/50/-4; adam placed; pit @ 4    PLAN:    Continue Close Maternal/Fetal Monitoring  Pitocin Augmentation per protocol  Recheck 2-4 hours or PRN    Kayla Wetzel MD  Obstetrics and Gynecology - PGY2   "

## 2025-02-17 NOTE — H&P
HISTORY AND PHYSICAL                                                OBSTETRICS          Subjective:       Margy Tai is a 34 y.o.  female with IUP at 40w0d weeks gestation who presents for scheduled IOL. Denies ctx, VB, LOF. Normal FM.     This IUP is complicated by asthma, bipolar disorder, obesity (ppBMI 37).    Review of Systems   Constitutional:  Negative for chills and fever.   Respiratory:  Negative for cough and shortness of breath.    Cardiovascular:  Negative for chest pain.   Gastrointestinal:  Negative for abdominal pain, constipation, diarrhea, nausea and vomiting.   Genitourinary:  Negative for bladder incontinence, pelvic pain, vaginal bleeding, vaginal discharge and vaginal odor.   Integumentary:  Negative for rash.   Psychiatric/Behavioral:  Negative for depression. The patient is not nervous/anxious.        PMHx:   Past Medical History:   Diagnosis Date    JAVI (acute kidney injury)     patient unsure of the cause, but was hospitalized for two weeks.    Asthma     Bipolar disorder, unspecified        PSHx: No past surgical history on file.    All:   Review of patient's allergies indicates:   Allergen Reactions    Cyanocobalamin (vitamin b12) Other (See Comments)     Stated that she was foaming at the mouth with muscle tightness     Lamictal [lamotrigine] Hives    Promethazine Hives       Meds: Prescriptions Prior to Admission[1]    SH: Social History[2]    FH:   Family History   Problem Relation Name Age of Onset    Kidney failure Mother          unilateral nephrectomy for unclear reason    Diabetes Sister      Diabetes Maternal Grandmother      Diabetes Maternal Uncle      Kidney failure Maternal Uncle          ESRD, h/o DM2, HTN    Diabetes Maternal Aunt         OBHx:   OB History    Para Term  AB Living   3 2 2 0 0 2   SAB IAB Ectopic Multiple Live Births   0 0 0 0 2      # Outcome Date GA Lbr Murphy/2nd Weight Sex Type Anes PTL Lv   3 Current            2 Term 18  40w1d  3.289 kg (7 lb 4 oz) F Vag-Spont   CRAIG   1 Term 08/21/17 39w0d  2.41 kg (5 lb 5 oz) M Vag-Spont   CRAIG       Objective:       There were no vitals taken for this visit.    There were no vitals filed for this visit.    General: NAD, alert, oriented, cooperative  HEENT: NCAT, EOM grossly intact  Lungs: normal WOB  Heart: regular rate  Abdomen: gravid uterus, soft, nontender, no rebound or guarding  Extremities: bilateral trace edema    FHT: 130 bpm, moderate variability, +accels, -decels; Cat 1 (reassuring)  [place]: q 9 mins  Presentation: cephalic by ultrasound    Cervix: cl/th/hi    EFW by [facility]: n/a    Maternal pelvis proven to 7lb 4oz    Lab Review  Blood Type A POS  GBBS: negative  Rubella: Immune  RPR/FTA: negative  HIV: negative  HepB: negative       Assessment:       40w0d weeks gestation with:    Active Hospital Problems    Diagnosis  POA    *Encounter for induction of labor [Z34.90]  Not Applicable    Obesity affecting pregnancy in third trimester [O99.213]  Yes    Asthma during pregnancy [O99.519, J45.909]  Yes    Bipolar affective disorder in remission [F31.70]  Yes     Currently no meds  Followed by [facility]        Resolved Hospital Problems   No resolved problems to display.          Plan:     IOL  - Consents reviewed, signed and to chart  - Admit to Labor and Delivery unit  - Epidural per Anesthesia  - Draw CBC, T&S  - Notify Staff  - Recheck in 4 hrs or PRN  - Plan for adam and cytotec  - Postpartum contraception: declines  - Postpartum hemorrhage risk: low    Obesity   - prepregnancy BMI 37  - current BMI 22  - Postpartum lovenox: not indicated at this time    Asthma  - albuterol PRN ordered  - asymptomatic    Bipolar disorder  - continue home med seroquel   - continue care at [facility]  - recommend 2w postpartum mood check    Elevated BP  - asymptomatic  - PreE labs ordered      Cecilio Newsome MD PGY2  Obstetrics and Gynecology       [1]   Medications Prior to Admission    Medication Sig Dispense Refill Last Dose/Taking    albuterol (PROVENTIL) 2.5 mg /3 mL (0.083 %) nebulizer solution Take 3 mLs (2.5 mg total) by nebulization every 6 (six) hours as needed for Wheezing. Rescue (Patient not taking: Reported on 2/10/2025) 180 mL 1     albuterol (VENTOLIN HFA) 90 mcg/actuation inhaler Inhale 2 puffs into the lungs every 6 (six) hours as needed for Wheezing. Rescue (Patient not taking: Reported on 2025) 18 g 3     famotidine (PEPCID) 20 MG tablet Take 1 tablet (20 mg total) by mouth daily as needed for Heartburn. 30 tablet 11     ferrous sulfate (FEOSOL) 325 mg (65 mg iron) Tab tablet Take 1 tablet (325 mg total) by mouth daily with breakfast. (Patient not taking: Reported on 2/10/2025) 30 tablet 11     fluticasone propionate (FLOVENT HFA) 44 mcg/actuation inhaler Inhale 1 puff into the lungs 2 (two) times daily. Controller (Patient not taking: Reported on 2/10/2025) 10.6 g 11     ondansetron (ZOFRAN-ODT) 8 MG TbDL Take 1 tablet (8 mg total) by mouth every 6 (six) hours as needed (nausea and vomiting). (Patient not taking: Reported on 2/10/2025) 30 tablet 1     prenatal vit no.124/iron/folic (PRENATAL VITAMIN ORAL) Take 1 Dose by mouth Daily.       QUEtiapine (SEROQUEL) 50 MG tablet Take by mouth.      [2]   Social History  Socioeconomic History    Marital status: Significant Other   Tobacco Use    Smoking status: Former     Current packs/day: 0.00     Types: Cigarettes     Quit date: 2024     Years since quittin.6    Smokeless tobacco: Never   Substance and Sexual Activity    Alcohol use: No    Drug use: Not Currently     Types: Marijuana     Comment: last used 24    Sexual activity: Yes     Partners: Male

## 2025-02-17 NOTE — ANESTHESIA PROCEDURE NOTES
Epidural    Patient location during procedure: OB   Reason for block: primary anesthetic   Reason for block: labor analgesia requested by patient and obstetrician  Diagnosis: iup   Start time: 2/17/2025 10:21 AM  Timeout: 2/17/2025 10:20 AM  End time: 2/17/2025 10:32 AM  Surgery related to: Vaginal Delivery    Staffing  Performing Provider: Heriberto Guerrero MD  Authorizing Provider: Lori Alcala MD    Staffing  Performed by: Heriberto Guerrero MD  Authorized by: Lori Alcala MD        Preanesthetic Checklist  Completed: patient identified, IV checked, site marked, risks and benefits discussed, surgical consent, monitors and equipment checked, pre-op evaluation, timeout performed, anesthesia consent given, hand hygiene performed and patient being monitored  Preparation  Patient position: sitting  Prep: ChloraPrep  Patient monitoring: Pulse Ox  Reason for block: primary anesthetic   Epidural  Skin Anesthetic: lidocaine 1%  Skin Wheal: 3 mL  Administration type: continuous  Approach: midline  Interspace: L3-4    Injection technique: MAITE saline  Needle and Epidural Catheter  Needle type: Tuohy   Needle gauge: 17  Needle length: 3.5 inches  Needle insertion depth: 9 cm  Catheter type: CAPPTURE  Catheter size: 19 G  Catheter at skin depth: 14 cm  Insertion Attempts: 1  Test dose: 3 mL of lidocaine 1.5% with Epi 1-to-200,000  Additional Documentation: incremental injection, negative aspiration for heme and CSF, no paresthesia on injection, no signs/symptoms of IV or SA injection, no significant pain on injection and no significant complaints from patient  Needle localization: anatomical landmarks  Medications:  Volume per aspiration: 5 mL  Time between aspirations: 5 minutes   Assessment  Ease of block: easy  Patient's tolerance of the procedure: comfortable throughout block and no complaints No inadvertent dural puncture with Tuohy.  Dural puncture performed with spinal needle.

## 2025-02-17 NOTE — PROGRESS NOTES
"LABOR NOTE    Documentation provided in regards to cervical check completed by Dr. Lori Sandoval MD, as follows:       S:  Complaints: No.  Epidural working:  not applicable  MD to bedside for cervical exam     O: /68   Pulse 78   Temp 97.8 °F (36.6 °C) (Oral)   Resp 18   Ht 5' 3" (1.6 m)   Wt 113.5 kg (250 lb 3.6 oz)   SpO2 98%   Breastfeeding No   BMI 44.32 kg/m²     FHT: 140bpm; moderate variability; +accels/-decels; Cat 1 (reassuring)  CTX: q 3-4 minutes, pit @ 4  SVE: 1/50/-4; adam placed    TIMELINE:  0113: vaginal cytotec placed  0530: 0/50/-3  0845: 0/50/-4; adam placed; pit @ 4  1130: 3/70/-3, s/p adam, pit @ 4     PLAN:    Continue Close Maternal/Fetal Monitoring  Pitocin Augmentation per protocol  Recheck 2-4 hours or PRN    Vania Johnston MD (Mary)   Obstetrics and Gynecology, PGY1  "

## 2025-02-17 NOTE — PROGRESS NOTES
"LABOR NOTE    S:  Complaints: No.  Epidural working:  not applicable  MD to bedside ISO fetal deceleration     O: /68   Pulse 88   Temp 97.8 °F (36.6 °C) (Oral)   Resp 18   Ht 5' 3" (1.6 m)   Wt 113.5 kg (250 lb 3.6 oz)   SpO2 99%   Breastfeeding No   BMI 44.32 kg/m²     FHT: 145bpm; moderate variability; +accels/+ 3 minute deceleration ISO BP drop during anesthesia procedure (Cat 2, overall reassuring)     CTX: q 3-4 minutes, pit paused   SVE: 1/50/-3     TIMELINE:  0113: vaginal cytotec placed  0530: 0/50/-3  0845: 0/50/-4; adam placed; pit @ 4  1130: 3/70/-3, s/p adam, pit @ 4   1430:  3/70/-3, membranes intact     PLAN:  Pitocin paused, LR bolus initiated. FHT return to baseline after positioning to right then left side. Deceleration likely secondary to epidural bolus.     -Continue Close Maternal/Fetal Monitoring  -Plan to recheck and possibly AROM in 1 hr or after fetal heart tracing returns to baseline   -reinitiate pitocin as tolerated   -Recheck 2-4 hrs or PRN     Vania Johnston MD (Mary)   Obstetrics and Gynecology, PGY1  "

## 2025-02-17 NOTE — ANESTHESIA PREPROCEDURE EVALUATION
[facility]  Anesthesia Pre-Operative Evaluation         Patient Name: Margy Tai  YOB: 1990  MRN: [id number]    2025      Margy Tai is a 34 y.o. female  @ 40w0d who presents for IOL. This IUP c/b asthma (uses her inhaler 1x/week and when sick, reports hx of bronchitis yearly), bipolar disorder (on seroquel), and obesity (BMI 44).    Also reports hx of a tailbone fracture about a year ago, managed conservatively, and sciatica.    Denies hx of bleeding disorders.    OB History    Para Term  AB Living   3 2 2   2   SAB IAB Ectopic Multiple Live Births       2      # Outcome Date GA Lbr Murphy/2nd Weight Sex Type Anes PTL Lv   3 Current            2 Term 18 40w1d  3.289 kg (7 lb 4 oz) F Vag-Spont   CRAIG   1 Term 17 39w0d  2.41 kg (5 lb 5 oz) M Vag-Spont   CRAIG       Review of patient's allergies indicates:   Allergen Reactions    Cyanocobalamin (vitamin b12) Other (See Comments)     Stated that she was foaming at the mouth with muscle tightness     Lamictal [lamotrigine] Hives    Promethazine Hives       Wt Readings from Last 1 Encounters:   02/10/25 0944 113.5 kg (250 lb 1.8 oz)       BP Readings from Last 3 Encounters:   02/10/25 115/85   25 124/82   25 108/78       Problem List[1]    No past surgical history on file.    Social History[2]      Chemistry        Component Value Date/Time     2017 1424    K 4.0 2017 1424     2017 1424    CO2 25 2017 1424    BUN 10 2017 1424    CREATININE 0.48 (L) 2017 1424     2024 0000        Component Value Date/Time    CALCIUM 8.5 2017 1424    ALKPHOS 27 (L) 2017 1424    AST 15 2017 1424    ALT 27 2017 1424    BILITOT 0.4 2017 1424    ESTGFRAFRICA >60 2017 1424    EGFRNONAA >60 2017 1424            Lab Results   Component Value Date    WBC 8.54 2025    HGB 10.7 (L) 2025    HCT 32.4 (L)  "01/28/2025    MCV 90 01/28/2025     01/28/2025       No results for input(s): "PT", "INR", "PROTIME", "APTT" in the last 72 hours.            Pre-op Assessment    I have reviewed the Patient Summary Reports.     I have reviewed the Nursing Notes. I have reviewed the NPO Status.   I have reviewed the Medications.     Review of Systems  Anesthesia Hx:   History of prior surgery of interest to airway management or planning:          Denies Family Hx of Anesthesia complications.    Denies Personal Hx of Anesthesia complications.                    Cardiovascular:  Cardiovascular Normal                                              Pulmonary:    Asthma                    Hepatic/GI:     GERD                Musculoskeletal:     Sciatica, tailbone fracture            Neurological:  Neurology Normal                                      Endocrine:  Endocrine Normal            Psych:  Psychiatric History                  Physical Exam  General: Well nourished, Cooperative, Alert and Oriented    Airway:  Mallampati: IV   Mouth Opening: Small, but > 3cm  TM Distance: Normal  Tongue: Normal  Neck ROM: Normal ROM    Dental:  Intact    Chest/Lungs:  Normal Respiratory Rate    Heart:  Rate: Normal        Anesthesia Plan  Type of Anesthesia, risks & benefits discussed:    Anesthesia Type: Gen ETT, Epidural, Spinal, CSE  Intra-op Monitoring Plan: Standard ASA Monitors  Post Op Pain Control Plan: multimodal analgesia and IV/PO Opioids PRN  Induction:  IV  Airway Plan: Video, Post-Induction  Informed Consent: Informed consent signed with the Patient and all parties understand the risks and agree with anesthesia plan.  All questions answered.   ASA Score: 3  Day of Surgery Review of History & Physical: H&P Update referred to the surgeon/provider.    Ready For Surgery From Anesthesia Perspective.     .           [1]   Patient Active Problem List  Diagnosis    Pyelonephritis    Bipolar affective disorder in remission    Moderate " asthma without complication    Asthma during pregnancy   [2]   Social History  Socioeconomic History    Marital status: Significant Other   Tobacco Use    Smoking status: Former     Current packs/day: 0.00     Types: Cigarettes     Quit date: 2024     Years since quittin.6    Smokeless tobacco: Never   Substance and Sexual Activity    Alcohol use: No    Drug use: Not Currently     Types: Marijuana     Comment: last used 24    Sexual activity: Yes     Partners: Male

## 2025-02-17 NOTE — PROGRESS NOTES
LABOR NOTE    S:  MD to bedside for routine cervical exam. Epidural working:  not applicable      O: /73   Pulse 81   Temp 98 °F (36.7 °C) (Oral)   Resp 18   SpO2 96%   Breastfeeding No     FHT: 140 bpm, moderate variability, +accels, -decels, Cat 1 (reassuring)  CTX: q 4 minutes, s/p vaginal cytotec  SVE: 0/40/-4    TIMELINE:  0045: 0/40/-4  0530: 0/40/-4    PLAN:  Will do cervical adam balloon at next check.  Continue Close Maternal/Fetal Monitoring  Will start pitocin and do Pitocin Augmentation per protocol  Recheck 4 hours or PRN      Cecilio Newsome MD PGY2  Obstetrics and Gynecology

## 2025-02-17 NOTE — CARE UPDATE
MD to bedside @1545 to discuss FHT. Prolonged decel at 1528 due to hypotension after epidural placement. Now with recurrent late decels s/p AROM @ 1515. Upon entering room patient repositioned on her L side with improvement in FHT, no further decels. Pitocin remains paused. Discussed plan for augmentation with pitocin once FHT remain reassuring/Cat 1. Discussed criteria for CS; patient had questions about steps and recovery. Discussed if baby can tolerate IOL can proceed however will need to assess response to pitocin. Patient in agreement. Will proceed with pitocin.     Babita Georges MD  PGY 4  Obstetrics and Gynecology

## 2025-02-17 NOTE — PROGRESS NOTES
"LABOR NOTE    S:  Complaints: No.  Epidural working:  not applicable  MD to bedside to place FSE and IUPC     O: /65   Pulse 90   Temp 97.6 °F (36.4 °C) (Oral)   Resp 17   Ht 5' 3" (1.6 m)   Wt 113.5 kg (250 lb 3.6 oz)   SpO2 100%   Breastfeeding No   BMI 44.32 kg/m²     FHT: 145bpm; moderate variability; +accels/+ 3 minute deceleration ISO BP drop during anesthesia procedure (Cat 2, overall reassuring)     CTX: q 3-4 minutes though difficult to discern on external toco; pit paused   SVE: 4/50/-3, AROM clear     TIMELINE:  0113: vaginal cytotec placed  0530: 0/50/-3  0845: 0/50/-4; adam placed; pit @ 4  1130: 3/70/-3, s/p adam, pit @ 4   1430:  3/70/-3, membranes intact   1520: 4/50/-3, AROM clear, IUPC and FSE placed     PLAN:  -Continue Close Maternal/Fetal Monitoring  -reinitiate pitocin as tolerated   -Recheck 2-4 hrs or PRN     Vania Johnston MD (Mary)   Obstetrics and Gynecology, PGY1  "

## 2025-02-17 NOTE — ANESTHESIA PREPROCEDURE EVALUATION
"[facility]  Anesthesia Pre-Operative Evaluation         Patient Name: Margy Tai  YOB: 1990  MRN: [id number]    2025      Margy Tai is a 34 y.o. female  @ 40w0d who presents for scheduled induction.    OB History    Para Term  AB Living   3 2 2   2   SAB IAB Ectopic Multiple Live Births       2      # Outcome Date GA Lbr Murphy/2nd Weight Sex Type Anes PTL Lv   3 Current            2 Term 18 40w1d  3.289 kg (7 lb 4 oz) F Vag-Spont   CRAIG   1 Term 17 39w0d  2.41 kg (5 lb 5 oz) M Vag-Spont   CRAIG       Review of patient's allergies indicates:   Allergen Reactions    Cyanocobalamin (vitamin b12) Other (See Comments)     Stated that she was foaming at the mouth with muscle tightness     Lamictal [lamotrigine] Hives    Promethazine Hives       Wt Readings from Last 1 Encounters:   25 0715 113.5 kg (250 lb 3.6 oz)       BP Readings from Last 3 Encounters:   25 111/68   02/10/25 115/85   25 124/82       Problem List[1]    No past surgical history on file.    Social History[2]      Chemistry        Component Value Date/Time     2025 013    K 4.3 2025 013     2025 013    CO2 20 (L) 2025 0131    BUN 11 2025 0131    CREATININE 0.6 2025 0131     (H) 2025 0131        Component Value Date/Time    CALCIUM 9.5 2025 0131    ALKPHOS 111 2025 0131    AST 13 2025 0131    ALT 9 (L) 2025 0131    BILITOT 0.2 2025 013    ESTGFRAFRICA >60 2017 1424    EGFRNONAA >60 2017 1424            Lab Results   Component Value Date    WBC 8.56 2025    HGB 10.9 (L) 2025    HCT 32.2 (L) 2025    MCV 87 2025     2025       No results for input(s): "PT", "INR", "PROTIME", "APTT" in the last 72 hours.            Pre-op Assessment    I have reviewed the Patient Summary Reports.     I have reviewed the Nursing Notes. I have " reviewed the NPO Status.   I have reviewed the Medications.     Review of Systems  Anesthesia Hx:  No problems with previous Anesthesia   History of prior surgery of interest to airway management or planning:          Denies Family Hx of Anesthesia complications.    Denies Personal Hx of Anesthesia complications.                    Hematology/Oncology:       -- Denies Anemia:                                  Cardiovascular:     Hypertension    Denies CAD.        Denies CHF.                             Hypertension         Pulmonary:    Denies COPD. Asthma       Asthma:               Renal/:  Chronic Renal Disease        Kidney Function/Disease             Hepatic/GI:      Denies GERD.                Neurological:    Denies CVA.    Denies Seizures.                                Endocrine:  Denies Diabetes.         Morbid Obesity / BMI > 40  Psych:  Psychiatric History                  Physical Exam  General: Well nourished, Alert, Cooperative and Oriented    Airway:  Mallampati: II   Mouth Opening: Normal  TM Distance: Normal  Tongue: Normal  Neck ROM: Normal ROM    Dental:  Intact    Chest/Lungs:  Normal Respiratory Rate    Heart:  Rate: Normal        Anesthesia Plan  Type of Anesthesia, risks & benefits discussed:    Anesthesia Type: Epidural  Intra-op Monitoring Plan: Standard ASA Monitors  Post Op Pain Control Plan: epidural analgesia  Informed Consent: Informed consent signed with the Patient and all parties understand the risks and agree with anesthesia plan.  All questions answered.   ASA Score: 3  Day of Surgery Review of History & Physical: H&P Update referred to the surgeon/provider.    Ready For Surgery From Anesthesia Perspective.     .           [1]   Patient Active Problem List  Diagnosis    Bipolar affective disorder in remission    Moderate asthma without complication    Asthma during pregnancy    Encounter for induction of labor    Obesity affecting pregnancy in third trimester    Gestational  hypertension, third trimester   [2]   Social History  Socioeconomic History    Marital status: Significant Other   Tobacco Use    Smoking status: Former     Current packs/day: 0.00     Types: Cigarettes     Quit date: 2024     Years since quittin.6    Smokeless tobacco: Never   Substance and Sexual Activity    Alcohol use: No    Drug use: Not Currently     Types: Marijuana     Comment: last used 24    Sexual activity: Yes     Partners: Male     Social Drivers of Health     Financial Resource Strain: High Risk (2025)    Overall Financial Resource Strain (CARDIA)     Difficulty of Paying Living Expenses: Very hard   Food Insecurity: No Food Insecurity (2025)    Hunger Vital Sign     Worried About Running Out of Food in the Last Year: Never true     Ran Out of Food in the Last Year: Never true   Stress: No Stress Concern Present (2025)    [employer] [place] [employer] - Occupational Stress Questionnaire     Feeling of Stress : Not at all   Housing Stability: Low Risk  (2025)    Housing Stability Vital Sign     Unable to Pay for Housing in the Last Year: No     Homeless in the Last Year: No

## 2025-02-18 ENCOUNTER — PATIENT MESSAGE (OUTPATIENT)
Dept: OBSTETRICS AND GYNECOLOGY | Facility: CLINIC | Age: 35
End: 2025-02-18
Payer: MEDICAID

## 2025-02-18 PROBLEM — Z98.891 STATUS POST PRIMARY LOW TRANSVERSE CESAREAN SECTION: Status: ACTIVE | Noted: 2025-02-18

## 2025-02-18 PROCEDURE — 36004724 HC OB OR TIME LEV III - 1ST 15 MIN: Performed by: STUDENT IN AN ORGANIZED HEALTH CARE EDUCATION/TRAINING PROGRAM

## 2025-02-18 PROCEDURE — 37000008 HC ANESTHESIA 1ST 15 MINUTES: Performed by: STUDENT IN AN ORGANIZED HEALTH CARE EDUCATION/TRAINING PROGRAM

## 2025-02-18 PROCEDURE — 63600175 PHARM REV CODE 636 W HCPCS: Performed by: STUDENT IN AN ORGANIZED HEALTH CARE EDUCATION/TRAINING PROGRAM

## 2025-02-18 PROCEDURE — 25000003 PHARM REV CODE 250

## 2025-02-18 PROCEDURE — 3E0P7VZ INTRODUCTION OF HORMONE INTO FEMALE REPRODUCTIVE, VIA NATURAL OR ARTIFICIAL OPENING: ICD-10-PCS | Performed by: STUDENT IN AN ORGANIZED HEALTH CARE EDUCATION/TRAINING PROGRAM

## 2025-02-18 PROCEDURE — 63600175 PHARM REV CODE 636 W HCPCS

## 2025-02-18 PROCEDURE — 36004725 HC OB OR TIME LEV III - EA ADD 15 MIN: Performed by: STUDENT IN AN ORGANIZED HEALTH CARE EDUCATION/TRAINING PROGRAM

## 2025-02-18 PROCEDURE — 37000009 HC ANESTHESIA EA ADD 15 MINS: Performed by: STUDENT IN AN ORGANIZED HEALTH CARE EDUCATION/TRAINING PROGRAM

## 2025-02-18 PROCEDURE — 10907ZC DRAINAGE OF AMNIOTIC FLUID, THERAPEUTIC FROM PRODUCTS OF CONCEPTION, VIA NATURAL OR ARTIFICIAL OPENING: ICD-10-PCS | Performed by: STUDENT IN AN ORGANIZED HEALTH CARE EDUCATION/TRAINING PROGRAM

## 2025-02-18 PROCEDURE — 71000039 HC RECOVERY, EACH ADD'L HOUR: Performed by: STUDENT IN AN ORGANIZED HEALTH CARE EDUCATION/TRAINING PROGRAM

## 2025-02-18 PROCEDURE — 63600175 PHARM REV CODE 636 W HCPCS: Mod: JZ,TB

## 2025-02-18 PROCEDURE — 25000003 PHARM REV CODE 250: Performed by: STUDENT IN AN ORGANIZED HEALTH CARE EDUCATION/TRAINING PROGRAM

## 2025-02-18 PROCEDURE — 11000001 HC ACUTE MED/SURG PRIVATE ROOM

## 2025-02-18 PROCEDURE — 71000033 HC RECOVERY, INTIAL HOUR: Performed by: STUDENT IN AN ORGANIZED HEALTH CARE EDUCATION/TRAINING PROGRAM

## 2025-02-18 RX ORDER — IBUPROFEN 600 MG/1
600 TABLET ORAL EVERY 6 HOURS
Qty: 60 TABLET | Refills: 0 | Status: SHIPPED | OUTPATIENT
Start: 2025-02-19

## 2025-02-18 RX ORDER — OXYCODONE HYDROCHLORIDE 5 MG/1
5 TABLET ORAL EVERY 4 HOURS PRN
Status: DISCONTINUED | OUTPATIENT
Start: 2025-02-18 | End: 2025-02-18

## 2025-02-18 RX ORDER — DOCUSATE SODIUM 100 MG/1
200 CAPSULE, LIQUID FILLED ORAL 2 TIMES DAILY
Status: DISCONTINUED | OUTPATIENT
Start: 2025-02-18 | End: 2025-02-20 | Stop reason: HOSPADM

## 2025-02-18 RX ORDER — ACETAMINOPHEN 325 MG/1
650 TABLET ORAL EVERY 6 HOURS
Qty: 60 TABLET | Refills: 0 | Status: SHIPPED | OUTPATIENT
Start: 2025-02-18

## 2025-02-18 RX ORDER — FAMOTIDINE 10 MG/ML
20 INJECTION INTRAVENOUS ONCE
Status: COMPLETED | OUTPATIENT
Start: 2025-02-18 | End: 2025-02-18

## 2025-02-18 RX ORDER — KETOROLAC TROMETHAMINE 30 MG/ML
INJECTION, SOLUTION INTRAMUSCULAR; INTRAVENOUS
Status: DISCONTINUED | OUTPATIENT
Start: 2025-02-18 | End: 2025-02-18

## 2025-02-18 RX ORDER — ACETAMINOPHEN 325 MG/1
650 TABLET ORAL EVERY 6 HOURS
Status: COMPLETED | OUTPATIENT
Start: 2025-02-18 | End: 2025-02-19

## 2025-02-18 RX ORDER — OXYCODONE HYDROCHLORIDE 10 MG/1
10 TABLET ORAL EVERY 4 HOURS PRN
Status: DISCONTINUED | OUTPATIENT
Start: 2025-02-18 | End: 2025-02-18

## 2025-02-18 RX ORDER — OXYTOCIN-SODIUM CHLORIDE 0.9% IV SOLN 30 UNIT/500ML 30-0.9/5 UT/ML-%
95 SOLUTION INTRAVENOUS CONTINUOUS PRN
Status: DISCONTINUED | OUTPATIENT
Start: 2025-02-18 | End: 2025-02-18

## 2025-02-18 RX ORDER — FENTANYL/BUPIVACAINE/NS/PF 2MCG/ML-.1
PLASTIC BAG, INJECTION (ML) INJECTION
Status: COMPLETED
Start: 2025-02-18 | End: 2025-02-18

## 2025-02-18 RX ORDER — KETOROLAC TROMETHAMINE 30 MG/ML
30 INJECTION, SOLUTION INTRAMUSCULAR; INTRAVENOUS EVERY 6 HOURS
Status: DISCONTINUED | OUTPATIENT
Start: 2025-02-18 | End: 2025-02-18

## 2025-02-18 RX ORDER — OXYCODONE HYDROCHLORIDE 5 MG/1
5 TABLET ORAL EVERY 4 HOURS PRN
Status: DISCONTINUED | OUTPATIENT
Start: 2025-02-18 | End: 2025-02-19

## 2025-02-18 RX ORDER — DOCUSATE SODIUM 100 MG/1
200 CAPSULE, LIQUID FILLED ORAL 2 TIMES DAILY
Qty: 120 CAPSULE | Refills: 0 | Status: SHIPPED | OUTPATIENT
Start: 2025-02-18

## 2025-02-18 RX ORDER — IBUPROFEN 600 MG/1
600 TABLET ORAL
Status: DISCONTINUED | OUTPATIENT
Start: 2025-02-19 | End: 2025-02-20 | Stop reason: HOSPADM

## 2025-02-18 RX ORDER — ADHESIVE BANDAGE
30 BANDAGE TOPICAL 2 TIMES DAILY PRN
Status: DISCONTINUED | OUTPATIENT
Start: 2025-02-19 | End: 2025-02-20 | Stop reason: HOSPADM

## 2025-02-18 RX ORDER — FENTANYL CITRATE 50 UG/ML
INJECTION, SOLUTION INTRAMUSCULAR; INTRAVENOUS
Status: DISCONTINUED | OUTPATIENT
Start: 2025-02-18 | End: 2025-02-18

## 2025-02-18 RX ORDER — AMOXICILLIN 250 MG
1 CAPSULE ORAL NIGHTLY PRN
Status: DISCONTINUED | OUTPATIENT
Start: 2025-02-18 | End: 2025-02-20 | Stop reason: HOSPADM

## 2025-02-18 RX ORDER — SODIUM CITRATE AND CITRIC ACID MONOHYDRATE 334; 500 MG/5ML; MG/5ML
30 SOLUTION ORAL ONCE
Status: COMPLETED | OUTPATIENT
Start: 2025-02-18 | End: 2025-02-18

## 2025-02-18 RX ORDER — SODIUM CITRATE AND CITRIC ACID MONOHYDRATE 334; 500 MG/5ML; MG/5ML
SOLUTION ORAL
Status: COMPLETED
Start: 2025-02-18 | End: 2025-02-18

## 2025-02-18 RX ORDER — OXYTOCIN-SODIUM CHLORIDE 0.9% IV SOLN 30 UNIT/500ML 30-0.9/5 UT/ML-%
95 SOLUTION INTRAVENOUS ONCE AS NEEDED
Status: DISCONTINUED | OUTPATIENT
Start: 2025-02-18 | End: 2025-02-18

## 2025-02-18 RX ORDER — MISOPROSTOL 200 UG/1
800 TABLET ORAL ONCE AS NEEDED
Status: DISCONTINUED | OUTPATIENT
Start: 2025-02-18 | End: 2025-02-20 | Stop reason: HOSPADM

## 2025-02-18 RX ORDER — BISACODYL 10 MG/1
10 SUPPOSITORY RECTAL ONCE AS NEEDED
Status: DISCONTINUED | OUTPATIENT
Start: 2025-02-18 | End: 2025-02-20 | Stop reason: HOSPADM

## 2025-02-18 RX ORDER — KETOROLAC TROMETHAMINE 30 MG/ML
30 INJECTION, SOLUTION INTRAMUSCULAR; INTRAVENOUS EVERY 6 HOURS
Status: COMPLETED | OUTPATIENT
Start: 2025-02-18 | End: 2025-02-19

## 2025-02-18 RX ORDER — MISOPROSTOL 200 UG/1
800 TABLET ORAL ONCE AS NEEDED
Status: DISCONTINUED | OUTPATIENT
Start: 2025-02-18 | End: 2025-02-18

## 2025-02-18 RX ORDER — DIPHENHYDRAMINE HCL 25 MG
25 CAPSULE ORAL EVERY 4 HOURS PRN
Status: DISCONTINUED | OUTPATIENT
Start: 2025-02-18 | End: 2025-02-20 | Stop reason: HOSPADM

## 2025-02-18 RX ORDER — ONDANSETRON HYDROCHLORIDE 2 MG/ML
4 INJECTION, SOLUTION INTRAVENOUS EVERY 6 HOURS PRN
Status: DISCONTINUED | OUTPATIENT
Start: 2025-02-18 | End: 2025-02-19

## 2025-02-18 RX ORDER — OXYTOCIN-SODIUM CHLORIDE 0.9% IV SOLN 30 UNIT/500ML 30-0.9/5 UT/ML-%
10 SOLUTION INTRAVENOUS ONCE AS NEEDED
Status: DISCONTINUED | OUTPATIENT
Start: 2025-02-18 | End: 2025-02-18

## 2025-02-18 RX ORDER — METHYLERGONOVINE MALEATE 0.2 MG/ML
200 INJECTION INTRAVENOUS ONCE AS NEEDED
Status: DISCONTINUED | OUTPATIENT
Start: 2025-02-18 | End: 2025-02-20 | Stop reason: HOSPADM

## 2025-02-18 RX ORDER — OXYTOCIN 10 [USP'U]/ML
INJECTION, SOLUTION INTRAMUSCULAR; INTRAVENOUS
Status: DISCONTINUED | OUTPATIENT
Start: 2025-02-18 | End: 2025-02-18

## 2025-02-18 RX ORDER — TRANEXAMIC ACID 10 MG/ML
1000 INJECTION, SOLUTION INTRAVENOUS EVERY 30 MIN PRN
Status: DISCONTINUED | OUTPATIENT
Start: 2025-02-18 | End: 2025-02-20 | Stop reason: HOSPADM

## 2025-02-18 RX ORDER — PRENATAL WITH FERROUS FUM AND FOLIC ACID 3080; 920; 120; 400; 22; 1.84; 3; 20; 10; 1; 12; 200; 27; 25; 2 [IU]/1; [IU]/1; MG/1; [IU]/1; MG/1; MG/1; MG/1; MG/1; MG/1; MG/1; UG/1; MG/1; MG/1; MG/1; MG/1
1 TABLET ORAL DAILY
Status: DISCONTINUED | OUTPATIENT
Start: 2025-02-18 | End: 2025-02-18

## 2025-02-18 RX ORDER — DIPHENOXYLATE HYDROCHLORIDE AND ATROPINE SULFATE 2.5; .025 MG/1; MG/1
2 TABLET ORAL EVERY 6 HOURS PRN
Status: DISCONTINUED | OUTPATIENT
Start: 2025-02-18 | End: 2025-02-20 | Stop reason: HOSPADM

## 2025-02-18 RX ORDER — OXYCODONE HYDROCHLORIDE 5 MG/1
5 TABLET ORAL EVERY 4 HOURS PRN
Qty: 20 TABLET | Refills: 0 | Status: SHIPPED | OUTPATIENT
Start: 2025-02-18

## 2025-02-18 RX ORDER — LIDOCAINE 50 MG/G
1 PATCH TOPICAL
Status: DISCONTINUED | OUTPATIENT
Start: 2025-02-18 | End: 2025-02-20 | Stop reason: HOSPADM

## 2025-02-18 RX ORDER — DEXAMETHASONE SODIUM PHOSPHATE 4 MG/ML
INJECTION, SOLUTION INTRA-ARTICULAR; INTRALESIONAL; INTRAMUSCULAR; INTRAVENOUS; SOFT TISSUE
Status: DISCONTINUED | OUTPATIENT
Start: 2025-02-18 | End: 2025-02-18

## 2025-02-18 RX ORDER — OXYCODONE HYDROCHLORIDE 10 MG/1
10 TABLET ORAL EVERY 4 HOURS PRN
Status: DISCONTINUED | OUTPATIENT
Start: 2025-02-18 | End: 2025-02-19

## 2025-02-18 RX ORDER — LIDOCAINE HYDROCHLORIDE 20 MG/ML
INJECTION, SOLUTION EPIDURAL; INFILTRATION; INTRACAUDAL; PERINEURAL
Status: DISCONTINUED | OUTPATIENT
Start: 2025-02-18 | End: 2025-02-18

## 2025-02-18 RX ORDER — ONDANSETRON 8 MG/1
8 TABLET, ORALLY DISINTEGRATING ORAL EVERY 8 HOURS PRN
Status: DISCONTINUED | OUTPATIENT
Start: 2025-02-19 | End: 2025-02-20 | Stop reason: HOSPADM

## 2025-02-18 RX ORDER — SODIUM CHLORIDE 0.9 % (FLUSH) 0.9 %
10 SYRINGE (ML) INJECTION
Status: DISCONTINUED | OUTPATIENT
Start: 2025-02-18 | End: 2025-02-20 | Stop reason: HOSPADM

## 2025-02-18 RX ORDER — OXYTOCIN 10 [USP'U]/ML
10 INJECTION, SOLUTION INTRAMUSCULAR; INTRAVENOUS ONCE AS NEEDED
Status: DISCONTINUED | OUTPATIENT
Start: 2025-02-18 | End: 2025-02-20 | Stop reason: HOSPADM

## 2025-02-18 RX ORDER — SIMETHICONE 80 MG
1 TABLET,CHEWABLE ORAL EVERY 6 HOURS PRN
Status: DISCONTINUED | OUTPATIENT
Start: 2025-02-18 | End: 2025-02-20

## 2025-02-18 RX ORDER — CARBOPROST TROMETHAMINE 250 UG/ML
250 INJECTION, SOLUTION INTRAMUSCULAR
Status: DISCONTINUED | OUTPATIENT
Start: 2025-02-18 | End: 2025-02-20 | Stop reason: HOSPADM

## 2025-02-18 RX ORDER — MORPHINE SULFATE 0.5 MG/ML
INJECTION, SOLUTION EPIDURAL; INTRATHECAL; INTRAVENOUS
Status: DISCONTINUED | OUTPATIENT
Start: 2025-02-18 | End: 2025-02-18

## 2025-02-18 RX ADMIN — ONDANSETRON 4 MG: 2 INJECTION INTRAMUSCULAR; INTRAVENOUS at 06:02

## 2025-02-18 RX ADMIN — DOCUSATE SODIUM 200 MG: 100 CAPSULE, LIQUID FILLED ORAL at 08:02

## 2025-02-18 RX ADMIN — OXYTOCIN 6 UNITS: 10 INJECTION, SOLUTION INTRAMUSCULAR; INTRAVENOUS at 04:02

## 2025-02-18 RX ADMIN — LIDOCAINE HYDROCHLORIDE 3 ML: 20 INJECTION, SOLUTION EPIDURAL; INFILTRATION; INTRACAUDAL; PERINEURAL at 04:02

## 2025-02-18 RX ADMIN — OXYTOCIN 5 UNITS: 10 INJECTION, SOLUTION INTRAMUSCULAR; INTRAVENOUS at 04:02

## 2025-02-18 RX ADMIN — LIDOCAINE HYDROCHLORIDE 5 ML: 20 INJECTION, SOLUTION EPIDURAL; INFILTRATION; INTRACAUDAL; PERINEURAL at 03:02

## 2025-02-18 RX ADMIN — ACETAMINOPHEN 650 MG: 325 TABLET, FILM COATED ORAL at 10:02

## 2025-02-18 RX ADMIN — SODIUM CITRATE AND CITRIC ACID MONOHYDRATE 30 ML: 500; 334 SOLUTION ORAL at 02:02

## 2025-02-18 RX ADMIN — FENTANYL CITRATE 100 MCG: 50 INJECTION INTRAMUSCULAR; INTRAVENOUS at 02:02

## 2025-02-18 RX ADMIN — ACETAMINOPHEN 650 MG: 325 TABLET, FILM COATED ORAL at 04:02

## 2025-02-18 RX ADMIN — OXYTOCIN 5 UNITS: 10 INJECTION, SOLUTION INTRAMUSCULAR; INTRAVENOUS at 03:02

## 2025-02-18 RX ADMIN — KETOROLAC TROMETHAMINE 30 MG: 30 INJECTION, SOLUTION INTRAMUSCULAR; INTRAVENOUS at 10:02

## 2025-02-18 RX ADMIN — ONDANSETRON 4 MG: 2 INJECTION INTRAMUSCULAR; INTRAVENOUS at 11:02

## 2025-02-18 RX ADMIN — OXYCODONE HYDROCHLORIDE 5 MG: 5 TABLET ORAL at 07:02

## 2025-02-18 RX ADMIN — DOCUSATE SODIUM 200 MG: 100 CAPSULE, LIQUID FILLED ORAL at 07:02

## 2025-02-18 RX ADMIN — SODIUM CHLORIDE: 9 INJECTION, SOLUTION INTRAVENOUS at 01:02

## 2025-02-18 RX ADMIN — MORPHINE SULFATE 1 MG: 0.5 INJECTION, SOLUTION EPIDURAL; INTRATHECAL; INTRAVENOUS at 04:02

## 2025-02-18 RX ADMIN — LIDOCAINE HYDROCHLORIDE 3 ML: 20 INJECTION, SOLUTION EPIDURAL; INFILTRATION; INTRACAUDAL; PERINEURAL at 02:02

## 2025-02-18 RX ADMIN — PHENYLEPHRINE HYDROCHLORIDE 0.3 MCG/KG/MIN: 10 INJECTION INTRAVENOUS at 03:02

## 2025-02-18 RX ADMIN — FENTANYL CITRATE 8 ML/HR: 50 INJECTION INTRAMUSCULAR; INTRAVENOUS at 12:02

## 2025-02-18 RX ADMIN — AZITHROMYCIN MONOHYDRATE 500 MG: 500 INJECTION, POWDER, LYOPHILIZED, FOR SOLUTION INTRAVENOUS at 02:02

## 2025-02-18 RX ADMIN — DEXAMETHASONE SODIUM PHOSPHATE 4 MG: 4 INJECTION, SOLUTION INTRA-ARTICULAR; INTRALESIONAL; INTRAMUSCULAR; INTRAVENOUS; SOFT TISSUE at 03:02

## 2025-02-18 RX ADMIN — KETOROLAC TROMETHAMINE 30 MG: 30 INJECTION, SOLUTION INTRAMUSCULAR; INTRAVENOUS at 04:02

## 2025-02-18 RX ADMIN — DIPHENHYDRAMINE HYDROCHLORIDE 25 MG: 25 CAPSULE ORAL at 12:02

## 2025-02-18 RX ADMIN — SODIUM CITRATE AND CITRIC ACID MONOHYDRATE 30 ML: 334; 500 SOLUTION ORAL at 02:02

## 2025-02-18 RX ADMIN — FAMOTIDINE 20 MG: 10 INJECTION, SOLUTION INTRAVENOUS at 05:02

## 2025-02-18 RX ADMIN — METOCLOPRAMIDE 10 MG: 10 TABLET ORAL at 12:02

## 2025-02-18 RX ADMIN — FAMOTIDINE 20 MG: 10 INJECTION, SOLUTION INTRAVENOUS at 02:02

## 2025-02-18 RX ADMIN — LIDOCAINE 1 PATCH: 50 PATCH CUTANEOUS at 01:02

## 2025-02-18 RX ADMIN — CEFAZOLIN 2 G: 330 INJECTION, POWDER, FOR SOLUTION INTRAMUSCULAR; INTRAVENOUS at 03:02

## 2025-02-18 NOTE — CARE UPDATE
Resident to bedside due to patient request.    Patient continues to endorse immense pressure and pain. Patient visibly upset throughout interview and states she desires pLTCS at this time.     SVE unchanged from previous.    Discussed all questions and concerns from patient and partner at bedside.    Charge RN, anesthesia, and staff notified. To OR for pLTCS 2/2 maternal request.    Cecilio Newsome MD PGY2  Obstetrics and Gynecology

## 2025-02-18 NOTE — PLAN OF CARE
Pt transferred to MBU today @ 7783. Patient safety maintained, side rails up, bed low and locked position. Pain well controlled with PRN pain medication. Fundus midline, firm, with moderate  lochia. Patient responding to infant cues.  Incision site clean, dry, and intact.  Significant other at bedside and assisting in patient's care. Will continue to monitor.     Problem: Breastfeeding  Goal: Effective Breastfeeding  2025 1541 by Eun Bender RN  Outcome: Progressing  2025 1228 by Eun Bender RN  Outcome: Met     Problem: Postpartum ( Delivery)  Goal: Successful Parent Role Transition  Outcome: Progressing  Goal: Hemostasis  Outcome: Progressing  Goal: Effective Bowel Elimination  Outcome: Progressing  Goal: Fluid and Electrolyte Balance  Outcome: Progressing  Goal: Absence of Infection Signs and Symptoms  Outcome: Progressing  Goal: Anesthesia/Sedation Recovery  Outcome: Progressing  Goal: Optimal Pain Control and Function  Outcome: Progressing  Goal: Nausea and Vomiting Relief  Outcome: Progressing  Goal: Effective Urinary Elimination  Outcome: Progressing  Goal: Effective Oxygenation and Ventilation  Outcome: Progressing

## 2025-02-18 NOTE — PROGRESS NOTES
"LABOR NOTE    S:  Complaints: nausea, increased pressure  Epidural working:  yes  To bedside due to patient calling out with increased pressure    O: /80   Pulse 96   Temp 98 °F (36.7 °C) (Oral)   Resp 17   Ht 5' 3" (1.6 m)   Wt 113.5 kg (250 lb 3.6 oz)   SpO2 97%   Breastfeeding No   BMI 44.32 kg/m²     FHT: 130 bpm, moderate variability, +accels/+subtle late decels (Cat 2 overall reassuring, improved with repositioning)  CTX: q 3-4 minutes  SVE: 5/60/-3    TIMELINE:  0113: vaginal cytotec placed  0530: 0/50/-3  0845: 0/50/-4; adam placed; pit @ 4  1130: 3/70/-3, s/p adam, pit @ 4   1430: 3/70/-3, membranes intact   1520: 4/50/-3, AROM clear, IUPC and FSE placed  1900: 4/60/-3, MVUs 160-180, pit @ 12  2100: 5/60/-3, pit @ 16    PLAN:    Continue Close Maternal/Fetal Monitoring  Continue Pitocin augmentation per protocol, decrease Pitocin by 2-4 mU/min if subtle late decelerations persist despite repositioning  Recheck 2-4 hrs or PRN    Nay Bernardo MD  OB/GYN PGY-1  "

## 2025-02-18 NOTE — DISCHARGE INSTRUCTIONS
Community Resources for Breastfeeding Mothers:   Hospital Breastfeeding Centers/ Lactation Consultants:   [facility]........................................................................................[phone]  Outpatient Lactation Consults               [phone]   [facility]....................................................[phone]   [facility]..........................................................[phone]   [facility].................................................[phone]   [facility].......................................................[phone]   [facility].................................[phone]   [facility].......................................[phone]   [facility]..................................................[phone]   [facility].....................................................................[phone]      [facility] (Poison Control)...........[phone]  PoisonHelp.org   Free medical advice 24/7 through the Poison Help Line and the online tool      Online Resources:   International Breastfeeding [place] ...............................................................................ibconline.ca   Dr David Randhawa online resource provides videos, articles, and information sheets.     Coeffective...............................................................................................................coeffective.com   Download the free mobile ir[place]a to help get off to a great start with breastfeeding.   Droplet.....................................................................................................................Transinsight.com   Global  Health Media...........................................................................................Sh[employer] Nouriz Dairy.org   Videos that teach and empower mothers and caregivers   Infant [facility] [[email or web address]] Center.............................................................................[phone]      [employer]   Provides up to date information for medication use by moms during pregnancy and while breastfeeding.   Lizz Bolivar....................................................................................................................kellymom.com   Provides online information on breastfeeding and parenting      La Leche League........................................................................................ lllalmsla.org   /   llli.org   Mother-to-mother support groups with education, information support, and encouragement    Work and Pump........................................................................................... oBaz.com   Information about breastfeeding for working moms     [place] Resources:   [place] Breastfeeding Coalition............................... [phone]    [facility]   Find local breastfeeding support   [place] Breastfeeding Support............................................................ LaBreastfeedingSport.org   Zip code search of breastfeeding resources in your area   Partners for Healthy Babies............................................................[phone]   0281679dfsg.org   Connects [place] moms and their families to health and pregnancy resources.  24/7   WIC (Women, Infant, Children)......................................................... [phone]   ldh.la.gov/WIC   Download the [employer]perMed ir[place]a, get code from WIC office    [facility] Resources:     Baby Cafe............................................................................................................. babycafeusa.org   Free, drop in, informal  breastfeeding support groups offering professional lactation care and intervention.    [facility] [place] [facility]....................................... birthmarkmarinanow.com   Infant feeding drop in clinics, Lactation services, support groups, education programs   [facility]afe [facility]...............................................[phone]   Linea.com/groups/[facility]   Free breastfeeding support group for families of color   Mothers Milk Bank [facility]....................................................[phone]                                                             NeedsMarketwired.[employer]/services/mothers-milk-bank-[facility]   ELDON Nesting..................................................................................[phone] nolanesting.com   In person and virtual support for families through pregnancy, birth, and early parenthood.       [facility] [place]- Dr. Patience Sidhu.......................[phone]   [address] [place] [address]                                  [email or web address]   [email or web address]   [employer] Lactation Care, [facility] (Vandana Rosa RN, IBCLC) ............................[phone] [email or web address]    Healthy Start [place].....................................[phone] ([place])  [phone] ([place])   [facility].gov/health-department/healthy-start   Serves women of childbearing age and addresses issues for pregnant women and their children from birth  to age two.          La Leche League- Aly Hubbell............................. [employer].eGenerations/ Linea.eGenerations/ Algiax Pharmaceuticalsniecy   In person and virtual mother to mother support groups with education, information support and   encouragement to women who want to breastfeed      [place] Resources:   Breastfeeding Resources- [facility]  Health.....msdh.ms.gov (under womens services)   Find resources and info about planning for breastfeeding, its benefits, and help with breastfeeding  s uccessfully.    [facility] [place]....................................... CodeHS   [phone]   [address] [place], [place]. Call or text 24/7   [facility].......................................................TravelSharkastbreastfeedingcenter.[employer]   [facility] Lactation Consultants se[facility], including [facility],   [facility], and surrounding areas.    [place] Breastfeeding Coalition...............................................................................msbfc.org   Promotes and supports breastfeeding with families, health providers, and communities.   [facility] breastfeeding Coalition.....................................................................smbfc.org   Find breastfeeding resources and support groups in your area.    WIC Nutrition Program- [facility] of Health.................................... ms.gov

## 2025-02-18 NOTE — PROGRESS NOTES
"LABOR NOTE    S:  Complaints: headache  Epidural working:  yes  To bedside due to patient calling out with increased pressure and urge to push    O: BP (!) 115/55   Pulse 85   Temp 98 °F (36.7 °C) (Oral)   Resp 17   Ht 5' 3" (1.6 m)   Wt 113.5 kg (250 lb 3.6 oz)   SpO2 99%   Breastfeeding No   BMI 44.32 kg/m²     FHT: 130 bpm, moderate variability, +accels/+variable decels Cat 2 (overall reassuring)  CTX: q 2-4 minutes  SVE: 6/70/-3    TIMELINE:  0113: vaginal cytotec placed  0530: 0/50/-3  0845: 0/50/-4; adam placed; pit @ 4  1130: 3/70/-3, s/p adam, pit @ 4   1430: 3/70/-3, membranes intact   1520: 4/50/-3, AROM clear, IUPC and FSE placed  1900: 4/60/-3, MVUs 160-180, pit @ 12  2100: 5/60/-3, pit @ 16  2345: 6/70/-3, MVUs 160-180, pit @ 14    PLAN:    Continue Close Maternal/Fetal Monitoring  Continue Pitocin augmentation per protocol  Recheck 2-4 hrs or PRN  Notify anesthesia if pain remains uncontrolled with repositioning  Reglan/Benadryl for headache    Nay Bernardo MD  OB/GYN PGY-1  "

## 2025-02-18 NOTE — PLAN OF CARE
Mother to breastfeed infant 8 or more times in 24hrs on infant's cue until content, frequent skin to skin, and will avoid bottles and pacifiers.  Mother is to keep infant actively feeding by keeping infant stimulated and using breast compression. Mother ensure effective nursing by hearing infant swallows and feeling nice tugs and pulls. Latch should not be painful while nursing.  Mother to record all breastfeedings, voids and stools in breastfeeding guide. Mother to call  for breastfeeding assistance or questions .  Refer breastfeeding guide for lactation education.

## 2025-02-18 NOTE — PROGRESS NOTES
"LABOR NOTE    S:  Complaints: no  Epidural working:  yes  To bedside due to patient calling out with increased pressure and urge to push.    O: /74   Pulse 92   Temp 98.1 °F (36.7 °C) (Oral)   Resp 17   Ht 5' 3" (1.6 m)   Wt 113.5 kg (250 lb 3.6 oz)   SpO2 98%   Breastfeeding No   BMI 44.32 kg/m²     FHT: 135 bpm, moderate variability, +accels/+variable decels Cat 2 (overall reassuring)  CTX: q 3-4 minutes, -160s, pit @ 12mU/min  SVE: 5/70/-3    TIMELINE:  0113: vaginal cytotec placed  0530: 0/50/-3  0845: 0/50/-4; adam placed; pit @ 4  1130: 3/70/-3, s/p adam, pit @ 4   1430: 3/70/-3, membranes intact   1520: 4/50/-3, AROM clear, IUPC and FSE placed  1900: 4/60/-3, MVUs 160-180, pit @ 12  2100: 5/60/-3, pit @ 16  2345: 6/70/-3, MVUs 160-180, pit @ 14  0145: 5/70/-3, -160s, pit @ 12mU/min    PLAN:  Patient continues to report immense pressure and appears in discomfort. RN to notify anesthesia for evaluation. Discussed findings with patient of 5cm dilation at this time. All questions addressed. Will attempt Gricelda circuit once patient is more comfortable.   Continue Close Maternal/Fetal Monitoring  Continue Pitocin augmentation per protocol  Recheck 2-4 hrs or PRN      Cecilio Newsome MD PGY2  Obstetrics and Gynecology    "

## 2025-02-18 NOTE — TRANSFER OF CARE
"Anesthesia Transfer of Care Note    Patient: Margy Tai    Procedure(s) Performed: Procedure(s) (LRB):   SECTION (N/A)    Patient location: Labor and Delivery    Anesthesia Type: epidural    Transport from OR: Transported from OR on room air with adequate spontaneous ventilation    Post pain: adequate analgesia    Post assessment: no apparent anesthetic complications    Post vital signs: stable    Level of consciousness: awake    Nausea/Vomiting: no nausea/vomiting    Complications: none    Transfer of care protocol was followed      Last vitals: Visit Vitals  /78   Pulse 103   Temp 36.8 °C (98.3 °F) (Oral)   Resp 17   Ht 5' 3" (1.6 m)   Wt 113.5 kg (250 lb 3.6 oz)   SpO2 99%   Breastfeeding No   BMI 44.32 kg/m²     "

## 2025-02-18 NOTE — LACTATION NOTE
02/18/25 1255   Maternal Assessment   Breast Shape Bilateral:;pendulous   Breast Density Bilateral:;soft   Areola Bilateral:;elastic   Nipples Bilateral:;everted   Maternal Infant Feeding   Maternal Emotional State anxious   Infant Positioning clutch/football   Signs of Milk Transfer infant jaw motion present   Pain with Feeding yes   Pain Location nipple, right   Pain Description sharp   Comfort Measures Before/During Feeding infant position adjusted;latch adjusted;maternal position adjusted;suction broken using finger   Latch Assistance yes     Pt called LC to room for latch assessment. Infant latched onto L breast in football position. Pt reports that she feels strong tugs and pulls. After a few minutes of infant sucking rhythmically, pt reports a sharp pinching pain of nipple. Showed pt how to unlatch baby if sharp pain persists. Assisted pt in hand expressing colostrum and pt able to latch infant back onto breast with minimal assistance. Visitors then arrived to pt's door. Pt would like to unlatch infant at this time and complete feeding later.  encouraged pt to call for any additional help.

## 2025-02-18 NOTE — PROGRESS NOTES
"LABOR NOTE    S:  Complaints: patient has mild headache.  Epidural working:  yes  To bedside for routine cervical exam    O: /66   Pulse 100   Temp 98.2 °F (36.8 °C) (Oral)   Resp 16   Ht 5' 3" (1.6 m)   Wt 113.5 kg (250 lb 3.6 oz)   SpO2 99%   Breastfeeding No   BMI 44.32 kg/m²     FHT: 120 bpm, moderate variability, +accels/no decels (Cat 1 reassuring)  CTX: q 3-4 minutes  SVE: 4/60/-3    TIMELINE:  0113: vaginal cytotec placed  0530: 0/50/-3  0845: 0/50/-4; adam placed; pit @ 4  1130: 3/70/-3, s/p adam, pit @ 4   1430:  3/70/-3, membranes intact   1520: 4/50/-3, AROM clear, IUPC and FSE placed  1900: 4/60/-3, MVUs 160-180, pit @ 12    PLAN:    Continue Close Maternal/Fetal Monitoring  Continue Pitocin augmentation per protocol   Recheck 2-4 hrs or PRN   Tylenol 1g for headache    Nay Bernardo MD  OB/GYN PGY-1  "

## 2025-02-18 NOTE — L&D DELIVERY NOTE
[facility], [place] - Labor & Delivery   Section   Operative Note    SUMMARY     Date of Procedure: 2025     Procedure: Procedure(s) (LRB):   SECTION (N/A)    Surgeons and Role:     * Pattie Lipscomb MD - Primary     * Viridiana Ledesma MD - Resident, Assisting     * Nay Bernardo MD - Resident, Assisting    Procedure:   1. Primary  Section via Pfannenstiel skin incision    Indications:   1.  Maternal request    Pre-operative Diagnosis:   1. IUP at 40w1d pregnancy  2. Gestational hypertension  3. Asthma  4. Bipolar disorder  5. Anxiety  6. Obesity (pre-pregnancy BMI 37)    Post-operative Diagnosis:   Same as above s/p pLTCS    Anesthesia: Spinal/Epidural    Findings:    1. Single viable  female infant, with APGARS 8/9, weight 3500g.   2. Normal appearing uterus, ovaries, and fallopian tubes.  3. Normal placenta.   4. Excellent hemostasis noted following closure of each tissue layer.    Estimated Blood Loss:  720 mL           Total IV Fluids: See anesthesia report.      UOP: See anesthesia report.    Specimens: Placenta, discarded         Complications:  None; patient tolerated the procedure well.           Disposition: PACU - hemodynamically stable.           Condition: stable    Procedure Details:   The patient was seen in her labor and delivery room where patient requested primary . Please see care update by resident Dr. Newsome for further details. The risks, benefits, complications, treatment options, and expected outcomes were discussed with the patient.  The patient concurred with the proposed plan, giving informed consent. The patient was taken to operating room, identified as Margy Tai and the procedure verified as  delivery. A time out was held and the above information confirmed.    The patient arrived to the operating room with epidural anesthesia and a Deras catheter in place. The patient was prepped and draped in the usual sterile manner while placed in  a dorsal supine position with a left lateral tilt.  Preoperative antibiotics (Ancef + azithromycin) were administered and an Allis test was performed yielding adequate anesthesia.  A Pfannenstiel incision was made and carried down through the subcutaneous tissue to the fascia. Fascial incision was made and extended transversely. The fascia was grasped with Kocher clamps and  from the underlying rectus tissue superiorly and inferiorly. The peritoneum was identified, found to be free of adherent bowel and entered bluntly. Peritoneal incision was extended longitudinally. The vesico-uterine peritoneum was identified and bladder blade was inserted to keep the bladder out of the operative field. A low transverse uterine incision was made with knife and extended with cephalocaudad traction. The amniotic sac was noted to be previously ruptured, however meconium stained fluid noted at time of delivery. The infant was noted to be in vertex (occiput posterior) position. The fetal head was brought to the incision and elevated out of the pelvis. The patient delivered a single viable female infant with moderate difficulty due to fetal head impaction in lower pelvis.  Infant weighed 3500 grams with Apgar scores of 8/9 at one and five minutes respectively. The umbilical cord was clamped and cut after an intentional one minute delay. A true knot was noted in the umbilical cord. The placenta was removed intact and appeared normal and was discarded. The uterus was exteriorized. The uterine outline, tubes and ovaries appeared normal. The uterine incision was closed with running locked sutures of Chromic. A figure-of-eight suture placed along hysterotomy for additional hemostasis. Hemostasis was observed. The uterus was returned to the abdominal cavity. Incision was reinspected and good hemostasis was noted. The abdominal cavity was wiped to remove clots. The peritoneum and muscle were inspected, and excellent hemostasis was  "noted. The fascia was then reapproximated with running sutures of PDS. The subcutaneous fat was irrigated, and small areas of bleeding were cauterized using Bovie cautery. The subcutaneous fat was closed in three layers of running suture of 2-0 Vicryl and one additional layer of interrupted sutures using 2-0 Vicryl. The skin was reapproximated with 4-0 Monocryl respectively. The wound was covered with a silver bandage.     Instrument, sponge, and needle counts were correct prior the abdominal closure and at the conclusion of the case.    Pt tolerated procedure well and was in stable condition after the procedure.    **Note: This patient IS a candidate for trial of labor after  delivery**    Nay Bernardo MD  OB/GYN PGY-1         Specimens:   Specimen (24h ago, onward)      None          Condition: Good    VTE Risk Mitigation (From admission, onward)           Ordered     IP VTE HIGH RISK PATIENT  Once         25     Place sequential compression device  Until discontinued         25                    Disposition: PACU - hemodynamically stable.    Attestation: Good         Delivery Information for Blake Tai    Birth information:  YOB: 2024   Time of birth: 3:50 AM   Sex: female   Head Delivery Date/Time: 2025  3:50 AM   Delivery type: , Low Transverse   Gestational Age: 40w1d       Delivery Providers    Delivering clinician: Pattie Lipscomb MD   Provider Role    Nicole Clark RN Cunningham, Claire, Francy Lombardo, Meera Hickman ST Singer, Emily, MD Williams, Brooke, MD               Measurements    Weight: 3500 g  Weight (lbs): 7 lb 11.5 oz  Length: 49.5 cm  Length (in): 19.5"  Head circumference: 34.9 cm  Chest circumference: 34.3 cm         Apgars    Living status: Living  Apgar Component Scores:  1 min.:  5 min.:  10 min.:  15 min.:  20 min.:    Skin color:  0  1       Heart rate:  2  2       Reflex " irritability:  2  2       Muscle tone:  2  2       Respiratory effort:  2  2       Total:  8  9       Apgars assigned by: MARYLOU GARCIA RN                   Presentation    Presentation: Vertex           Interventions/Resuscitation    Method: Bulb Suctioning, Tactile Stimulation, Deep Suctioning       Cord    Vessels: 3 vessels  Complications: Knot  Delayed Cord Clamping?: Yes  Cord Clamped Date/Time: 2025  3:51 AM  Cord Blood Disposition: Discarded  Gases Sent?: No       Placenta    Placenta delivery date/time: 2025 03:52  Placenta removal: Manual removal  Placenta appearance: Intact  Placenta disposition: Donation           Labor Events:       labor: No     Labor Onset Date/Time:         Dilation Complete Date/Time:         Start Pushing Date/Time:         Start Pushing Date/Time:       Rupture Date/Time: 25 1514        Rupture type: ARM (Artificial Rupture)        Fluid Amount:       Fluid Color: Clear              steroids: None     Antibiotics given for GBS: No     Induction: misoprostol;balloon dilation (Deras)     Indications for induction:  Elective     Augmentation: amniotomy;oxytocin     Indications for augmentation: Ineffective Contraction Pattern     Labor complications:       Additional complications:          Cervical ripening:                     Delivery:      Episiotomy:       Indication for Episiotomy:       Perineal Lacerations:   Repaired:      Periurethral Laceration:   Repaired:     Labial Laceration:   Repaired:     Sulcus Laceration:   Repaired:     Vaginal Laceration:   Repaired:     Cervical Laceration:   Repaired:     Repair suture:       Repair # of packets:       Last Value - EBL - Nursing (mL):       Sum - EBL - Nursing (mL): 0     Last Value - EBL - Anesthesia (mL):      Calculated QBL (mL): 720     Running total QBL (mL): 720     Vaginal Sweep Performed:       Surgicount Correct:       Vaginal Packing:   Quantity:       Other providers:       Anesthesia     Method: Epidural          Details (if applicable):  Trial of Labor Yes    Categorization: Primary    Priority: Routine   Indications for : Failed induction   Incision Type: low transverse     Additional  information:  Forceps:    Vacuum:    Breech:    Observed anomalies    Other (Comments):

## 2025-02-19 PROBLEM — O99.343 MENTAL DISORDER AFFECTING PREGNANCY IN THIRD TRIMESTER: Status: ACTIVE | Noted: 2025-02-19

## 2025-02-19 PROBLEM — D62 ACUTE BLOOD LOSS ANEMIA (ABLA): Status: ACTIVE | Noted: 2025-02-19

## 2025-02-19 LAB
BASOPHILS # BLD AUTO: 0.02 K/UL (ref 0–0.2)
BASOPHILS NFR BLD: 0.2 % (ref 0–1.9)
DIFFERENTIAL METHOD BLD: ABNORMAL
EOSINOPHIL # BLD AUTO: 0.1 K/UL (ref 0–0.5)
EOSINOPHIL NFR BLD: 0.8 % (ref 0–8)
ERYTHROCYTE [DISTWIDTH] IN BLOOD BY AUTOMATED COUNT: 14.1 % (ref 11.5–14.5)
HCT VFR BLD AUTO: 26.6 % (ref 37–48.5)
HGB BLD-MCNC: 8.8 G/DL (ref 12–16)
IMM GRANULOCYTES # BLD AUTO: 0.05 K/UL (ref 0–0.04)
IMM GRANULOCYTES NFR BLD AUTO: 0.5 % (ref 0–0.5)
LYMPHOCYTES # BLD AUTO: 2.1 K/UL (ref 1–4.8)
LYMPHOCYTES NFR BLD: 20.6 % (ref 18–48)
MCH RBC QN AUTO: 29.1 PG (ref 27–31)
MCHC RBC AUTO-ENTMCNC: 33.1 G/DL (ref 32–36)
MCV RBC AUTO: 88 FL (ref 82–98)
MONOCYTES # BLD AUTO: 0.5 K/UL (ref 0.3–1)
MONOCYTES NFR BLD: 5.1 % (ref 4–15)
NEUTROPHILS # BLD AUTO: 7.6 K/UL (ref 1.8–7.7)
NEUTROPHILS NFR BLD: 72.8 % (ref 38–73)
NRBC BLD-RTO: 0 /100 WBC
PLATELET # BLD AUTO: 188 K/UL (ref 150–450)
PMV BLD AUTO: 10.9 FL (ref 9.2–12.9)
RBC # BLD AUTO: 3.02 M/UL (ref 4–5.4)
WBC # BLD AUTO: 10.36 K/UL (ref 3.9–12.7)

## 2025-02-19 PROCEDURE — 25000003 PHARM REV CODE 250: Performed by: STUDENT IN AN ORGANIZED HEALTH CARE EDUCATION/TRAINING PROGRAM

## 2025-02-19 PROCEDURE — 25000003 PHARM REV CODE 250

## 2025-02-19 PROCEDURE — 63600175 PHARM REV CODE 636 W HCPCS: Mod: JZ,TB

## 2025-02-19 PROCEDURE — 85025 COMPLETE CBC W/AUTO DIFF WBC: CPT | Performed by: OBSTETRICS & GYNECOLOGY

## 2025-02-19 PROCEDURE — 36415 COLL VENOUS BLD VENIPUNCTURE: CPT | Performed by: OBSTETRICS & GYNECOLOGY

## 2025-02-19 PROCEDURE — 11000001 HC ACUTE MED/SURG PRIVATE ROOM

## 2025-02-19 RX ORDER — ACETAMINOPHEN 325 MG/1
650 TABLET ORAL EVERY 6 HOURS
Status: DISCONTINUED | OUTPATIENT
Start: 2025-02-19 | End: 2025-02-20 | Stop reason: HOSPADM

## 2025-02-19 RX ORDER — FAMOTIDINE 20 MG/1
20 TABLET, FILM COATED ORAL 2 TIMES DAILY PRN
Status: DISCONTINUED | OUTPATIENT
Start: 2025-02-19 | End: 2025-02-20 | Stop reason: HOSPADM

## 2025-02-19 RX ORDER — CALCIUM CARBONATE 200(500)MG
1000 TABLET,CHEWABLE ORAL ONCE
Status: COMPLETED | OUTPATIENT
Start: 2025-02-19 | End: 2025-02-19

## 2025-02-19 RX ORDER — LIDOCAINE 50 MG/G
1 PATCH TOPICAL DAILY
Qty: 3 PATCH | Refills: 0 | Status: SHIPPED | OUTPATIENT
Start: 2025-02-19

## 2025-02-19 RX ORDER — OXYCODONE HYDROCHLORIDE 5 MG/1
5 TABLET ORAL EVERY 4 HOURS PRN
Refills: 0 | Status: DISCONTINUED | OUTPATIENT
Start: 2025-02-19 | End: 2025-02-20 | Stop reason: HOSPADM

## 2025-02-19 RX ORDER — CALCIUM CARBONATE 200(500)MG
1000 TABLET,CHEWABLE ORAL 3 TIMES DAILY PRN
Status: DISCONTINUED | OUTPATIENT
Start: 2025-02-19 | End: 2025-02-20 | Stop reason: HOSPADM

## 2025-02-19 RX ORDER — OXYCODONE HYDROCHLORIDE 10 MG/1
10 TABLET ORAL EVERY 4 HOURS PRN
Refills: 0 | Status: DISCONTINUED | OUTPATIENT
Start: 2025-02-19 | End: 2025-02-20 | Stop reason: HOSPADM

## 2025-02-19 RX ADMIN — ACETAMINOPHEN 650 MG: 325 TABLET, FILM COATED ORAL at 11:02

## 2025-02-19 RX ADMIN — ACETAMINOPHEN 650 MG: 325 TABLET, FILM COATED ORAL at 04:02

## 2025-02-19 RX ADMIN — CALCIUM CARBONATE (ANTACID) CHEW TAB 500 MG 1000 MG: 500 CHEW TAB at 11:02

## 2025-02-19 RX ADMIN — LIDOCAINE 1 PATCH: 50 PATCH CUTANEOUS at 01:02

## 2025-02-19 RX ADMIN — IBUPROFEN 600 MG: 600 TABLET ORAL at 11:02

## 2025-02-19 RX ADMIN — ACETAMINOPHEN 650 MG: 325 TABLET, FILM COATED ORAL at 03:02

## 2025-02-19 RX ADMIN — OXYCODONE HYDROCHLORIDE 5 MG: 5 TABLET ORAL at 03:02

## 2025-02-19 RX ADMIN — ACETAMINOPHEN 650 MG: 325 TABLET, FILM COATED ORAL at 10:02

## 2025-02-19 RX ADMIN — CALCIUM CARBONATE (ANTACID) CHEW TAB 500 MG 1000 MG: 500 CHEW TAB at 05:02

## 2025-02-19 RX ADMIN — OXYCODONE HYDROCHLORIDE 10 MG: 10 TABLET ORAL at 08:02

## 2025-02-19 RX ADMIN — OXYCODONE HYDROCHLORIDE 5 MG: 5 TABLET ORAL at 04:02

## 2025-02-19 RX ADMIN — FAMOTIDINE 20 MG: 20 TABLET, FILM COATED ORAL at 11:02

## 2025-02-19 RX ADMIN — DOCUSATE SODIUM 200 MG: 100 CAPSULE, LIQUID FILLED ORAL at 08:02

## 2025-02-19 RX ADMIN — ONDANSETRON 8 MG: 8 TABLET, ORALLY DISINTEGRATING ORAL at 10:02

## 2025-02-19 RX ADMIN — KETOROLAC TROMETHAMINE 30 MG: 30 INJECTION, SOLUTION INTRAMUSCULAR; INTRAVENOUS at 03:02

## 2025-02-19 RX ADMIN — IBUPROFEN 600 MG: 600 TABLET ORAL at 04:02

## 2025-02-19 RX ADMIN — IBUPROFEN 600 MG: 600 TABLET ORAL at 10:02

## 2025-02-19 RX ADMIN — OXYCODONE HYDROCHLORIDE 10 MG: 10 TABLET ORAL at 12:02

## 2025-02-19 RX ADMIN — OXYCODONE HYDROCHLORIDE 5 MG: 5 TABLET ORAL at 08:02

## 2025-02-19 NOTE — PLAN OF CARE
Patient safety maintained, side rails up, bed low and locked position. Pt ambulating and voiding independently.  Pain well controlled with PRN pain medication. Fundus midline, firm, with light lochia. Patient responding to infant cues. Incision site dressed; clean, dry, and intact.  Significant other at bedside and assisting in patient's care. Will continue to intervene as necessary.

## 2025-02-19 NOTE — PROGRESS NOTES
POSTPARTUM PROGRESS NOTE    Subjective:     PPD/POD#: 1   Procedure: Primary LTCS   EGA: 40w1d   N/V: Denies vomiting. Notes nausea after meals.   F/C: No   Abd Pain: Mild, well-controlled with oral pain medication   Lochia: Mild   Voiding: Yes   Ambulating: Yes   Bowel fnc: Denies bowel movement. She has passed flatus.   Contraception: Desires BTL. Potential Vasectomy   Patient notes 4/10 pain that has improved with medication - previously 7/10. She reports that she was nauseous after every meal yesterday but is doing well now. Denies emesis.    Objective:      Temp:  [97.9 °F (36.6 °C)-98.5 °F (36.9 °C)] 98.1 °F (36.7 °C)  Pulse:  [84-88] 87  Resp:  [15-18] 17  SpO2:  [96 %-98 %] 98 %  BP: (104-123)/(59-65) 123/59    Abdomen: Soft, appropriately tender   Uterus: Firm, no fundal tenderness   Incision: Minimal Shadowing     Lab Review    Recent Labs   Lab 02/17/25  0131      K 4.3      CO2 20*   BUN 11   CREATININE 0.6   *   PROT 6.7   BILITOT 0.2   ALKPHOS 111   ALT 9*   AST 13       Recent Labs   Lab 02/17/25  0029 02/19/25  0338   WBC 8.56 10.36   HGB 10.9* 8.8*   HCT 32.2* 26.6*   MCV 87 88    188         I/O    Intake/Output Summary (Last 24 hours) at 2/19/2025 1005  Last data filed at 2/19/2025 0430  Gross per 24 hour   Intake --   Output 1345 ml   Net -1345 ml        Assessment and Plan:   Postpartum care:  - Patient notes 4/10 pain that has improved with medication - previously 7/10. She reports that she was nauseous after every meal yesterday but is doing well now.   - Continue routine management and advances.    Contraception  - She desires BTL and is interested in more information. She reports that her partner might be undergoing a vasectomy    ABLA  - H/H as above  - QBL: 720 mL  - asymptomatic  - iron/colace     gHTN  - BP as above  - asymptomatic  - preE labs as above  - UOP: 0.6 cc/kg/hr  - Mag: not indicated  - Hypertensive agent: not indicated     Asthma  - albuterol PRN  ordered  - asymptomatic     Bipolar disorder  - continue home med seroquel   - continue care at [facility]  - recommend 2w postpartum mood check    Obesity   - prepregnancy BMI 37  - current BMI 44.32  - Postpartum lovenox: not indicated at this time        Iain Corrigan, MS3  [employer]    Seen and examined.  Agree with note.  All questions answered

## 2025-02-19 NOTE — PLAN OF CARE
Patient safety maintained, side rails up, bed low and locked position. Pt ambulating and voiding independently. Pain well controlled with scheduled and PRN pain medication. Fundus midline, firm, with moderate lochia. Patient responding to infant cues. Incisional dressing dry and intact, with scant dried drainage. Significant other at bedside and assisting in patient's care.

## 2025-02-19 NOTE — ANESTHESIA POSTPROCEDURE EVALUATION
Anesthesia Post Evaluation    Patient: Margy Tai    Procedure(s) Performed: * No procedures listed *    Final Anesthesia Type: epidural      Patient location during evaluation: floor  Patient participation: Yes- Able to Participate  Level of consciousness: awake and alert  Post-procedure vital signs: reviewed and stable  Pain management: adequate  Airway patency: patent  CLYDE mitigation strategies: Multimodal analgesia  PONV status at discharge: No PONV  Anesthetic complications: no      Cardiovascular status: blood pressure returned to baseline  Respiratory status: unassisted and spontaneous ventilation  Hydration status: euvolemic  Follow-up not needed.              Vitals Value Taken Time   /59 02/19/25 08:39   Temp 36.7 °C (98.1 °F) 02/19/25 08:39   Pulse 87 02/19/25 08:39   Resp 17 02/19/25 08:40   SpO2 98 % 02/19/25 08:39         No case tracking events are documented in the log.      Pain/Alyssa Score: Pain Rating Prior to Med Admin: 4 (2/19/2025  8:40 AM)  Pain Rating Post Med Admin: 2 (2/19/2025  4:45 AM)

## 2025-02-19 NOTE — MEDICAL/APP STUDENT
POSTPARTUM PROGRESS NOTE    Subjective:     PPD/POD#: 1   Procedure: Primary LTCS   EGA: 40w1d   N/V: Denies vomiting. Notes nausea after meals.   F/C: No   Abd Pain: Mild, well-controlled with oral pain medication   Lochia: Mild   Voiding: Yes   Ambulating: Yes   Bowel fnc: Denies bowel movement. She has passed flatus.   Contraception: Desires BTL. Potential Vasectomy   Patient notes 4/10 pain that has improved with medication - previously 7/10. She reports that she was nauseous after every meal yesterday but is doing well now. Denies emesis.    Objective:      Temp:  [97.9 °F (36.6 °C)-98.9 °F (37.2 °C)] 97.9 °F (36.6 °C)  Pulse:  [] 85  Resp:  [15-18] 17  SpO2:  [96 %-97 %] 97 %  BP: (104-133)/(60-75) 104/61    Abdomen: Soft, appropriately tender   Uterus: Firm, no fundal tenderness   Incision: Minimal Shadowing     Lab Review    Recent Labs   Lab 02/17/25  0131      K 4.3      CO2 20*   BUN 11   CREATININE 0.6   *   PROT 6.7   BILITOT 0.2   ALKPHOS 111   ALT 9*   AST 13       Recent Labs   Lab 02/17/25  0029 02/19/25  0338   WBC 8.56 10.36   HGB 10.9* 8.8*   HCT 32.2* 26.6*   MCV 87 88    188         I/O    Intake/Output Summary (Last 24 hours) at 2/19/2025 0643  Last data filed at 2/19/2025 0430  Gross per 24 hour   Intake --   Output 1695 ml   Net -1695 ml        Assessment and Plan:   Postpartum care:  - Patient notes 4/10 pain that has improved with medication - previously 7/10. She reports that she was nauseous after every meal yesterday but is doing well now.   - Continue routine management and advances.    Contraception  - She desires BTL and is interested in more information. She reports that her partner might be undergoing a vasectomy    ABLA  - H/H as above  - QBL: 720 mL  - asymptomatic  - iron/colace     gHTN  - BP as above  - asymptomatic  - preE labs as above  - UOP: 0.6 cc/kg/hr  - Mag: not indicated  - Hypertensive agent: not indicated     Asthma  - albuterol PRN  ordered  - asymptomatic     Bipolar disorder  - continue home med seroquel   - continue care at [facility]  - recommend 2w postpartum mood check    Obesity   - prepregnancy BMI 37  - current BMI 44.32  - Postpartum lovenox: not indicated at this time        Iain Corrigan, MS3  [employer]

## 2025-02-19 NOTE — LACTATION NOTE
This note was copied from a baby's chart.  Mother requested use of donor breast milk to use in addition to feeding baby at breasts. RN provided parents with educational paperwork and had mother sign consent form. All questions and concerns addressed.

## 2025-02-20 VITALS
BODY MASS INDEX: 44.34 KG/M2 | HEIGHT: 63 IN | DIASTOLIC BLOOD PRESSURE: 79 MMHG | HEART RATE: 91 BPM | TEMPERATURE: 98 F | RESPIRATION RATE: 18 BRPM | OXYGEN SATURATION: 100 % | WEIGHT: 250.25 LBS | SYSTOLIC BLOOD PRESSURE: 142 MMHG

## 2025-02-20 PROCEDURE — 25000003 PHARM REV CODE 250

## 2025-02-20 PROCEDURE — 25000003 PHARM REV CODE 250: Performed by: OBSTETRICS & GYNECOLOGY

## 2025-02-20 RX ORDER — POLYETHYLENE GLYCOL 3350 17 G/17G
17 POWDER, FOR SOLUTION ORAL DAILY
Status: DISCONTINUED | OUTPATIENT
Start: 2025-02-20 | End: 2025-02-20 | Stop reason: HOSPADM

## 2025-02-20 RX ORDER — GLYCERIN 1 G/1
1 SUPPOSITORY RECTAL ONCE
Status: DISCONTINUED | OUTPATIENT
Start: 2025-02-20 | End: 2025-02-20 | Stop reason: HOSPADM

## 2025-02-20 RX ORDER — SIMETHICONE 80 MG
2 TABLET,CHEWABLE ORAL 3 TIMES DAILY
Status: DISCONTINUED | OUTPATIENT
Start: 2025-02-20 | End: 2025-02-20 | Stop reason: HOSPADM

## 2025-02-20 RX ORDER — SIMETHICONE 80 MG
1 TABLET,CHEWABLE ORAL 3 TIMES DAILY PRN
Status: DISCONTINUED | OUTPATIENT
Start: 2025-02-20 | End: 2025-02-20 | Stop reason: HOSPADM

## 2025-02-20 RX ADMIN — FAMOTIDINE 20 MG: 20 TABLET, FILM COATED ORAL at 07:02

## 2025-02-20 RX ADMIN — SIMETHICONE 160 MG: 80 TABLET, CHEWABLE ORAL at 08:02

## 2025-02-20 RX ADMIN — ACETAMINOPHEN 650 MG: 325 TABLET, FILM COATED ORAL at 04:02

## 2025-02-20 RX ADMIN — ONDANSETRON 8 MG: 8 TABLET, ORALLY DISINTEGRATING ORAL at 08:02

## 2025-02-20 RX ADMIN — OXYCODONE HYDROCHLORIDE 10 MG: 10 TABLET ORAL at 02:02

## 2025-02-20 RX ADMIN — CALCIUM CARBONATE (ANTACID) CHEW TAB 500 MG 1000 MG: 500 CHEW TAB at 04:02

## 2025-02-20 RX ADMIN — POLYETHYLENE GLYCOL 3350 17 G: 17 POWDER, FOR SOLUTION ORAL at 08:02

## 2025-02-20 RX ADMIN — OXYCODONE HYDROCHLORIDE 10 MG: 10 TABLET ORAL at 07:02

## 2025-02-20 RX ADMIN — LIDOCAINE 1 PATCH: 50 PATCH CUTANEOUS at 02:02

## 2025-02-20 RX ADMIN — ACETAMINOPHEN 650 MG: 325 TABLET, FILM COATED ORAL at 11:02

## 2025-02-20 RX ADMIN — DOCUSATE SODIUM 200 MG: 100 CAPSULE, LIQUID FILLED ORAL at 08:02

## 2025-02-20 RX ADMIN — IBUPROFEN 600 MG: 600 TABLET ORAL at 04:02

## 2025-02-20 RX ADMIN — IBUPROFEN 600 MG: 600 TABLET ORAL at 11:02

## 2025-02-20 RX ADMIN — SIMETHICONE 160 MG: 80 TABLET, CHEWABLE ORAL at 03:02

## 2025-02-20 NOTE — PROGRESS NOTES
POSTPARTUM PROGRESS NOTE    Subjective:     PPD/POD#: 1   Procedure: Primary LTCS   EGA: 40w1d   N/V: No   F/C: No   Abd Pain: Moderate, well-controlled with oral pain medication   Lochia: Mild   Voiding: Yes   Ambulating: Yes   Bowel fnc: Denies bowel movement. She has passed flatus.   Contraception: Desires BTL. Potential Vasectomy   NAEO. Reporting difficulty passing gas and reports h/o of constipation and is requesting changes to bowel regimen.   Objective:      Temp:  [97.9 °F (36.6 °C)-99.2 °F (37.3 °C)] 99.2 °F (37.3 °C)  Pulse:  [] 86  Resp:  [16-18] 18  SpO2:  [96 %-98 %] 96 %  BP: ()/(59-72) 120/65    Abdomen: Soft, appropriately tender   Uterus: Firm, no fundal tenderness   Incision: Minimal Shadowing     Lab Review    Recent Labs   Lab 02/17/25  0131      K 4.3      CO2 20*   BUN 11   CREATININE 0.6   *   PROT 6.7   BILITOT 0.2   ALKPHOS 111   ALT 9*   AST 13       Recent Labs   Lab 02/17/25  0029 02/19/25  0338   WBC 8.56 10.36   HGB 10.9* 8.8*   HCT 32.2* 26.6*   MCV 87 88    188         I/O  No intake or output data in the 24 hours ending 02/20/25 0658       Assessment and Plan:   Postpartum care:  - over all doing well & meeting post op milestones  - simethicone scheduled & rectal glycerin suppository added   - Continue routine management and advances.    Contraception  - She desires BTL and is interested in more information. She reports that her partner might be undergoing a vasectomy    ABLA  - H/H as above  - QBL: 720 mL  - asymptomatic  - iron/colace     gHTN  - BP as above  - asymptomatic  - preE labs as above  - UOP: 0.6 cc/kg/hr  - Mag: not indicated  - Hypertensive agent: not indicated     Asthma  - albuterol PRN ordered  - asymptomatic     Bipolar disorder  - telepsych consult: patient to discontinue seroquel, and restart pre-pregnancy regimen with Trilepto and Risperidone. Patient will continue to meet with her psychiatrist after discharge at [facility]  -  recommend 2w postpartum mood check    Obesity   - prepregnancy BMI 37  - current BMI 44.32  - Postpartum lovenox: not indicated at this time     Kitty Hilario MD  [facility]   OBGYN PGY-1

## 2025-02-20 NOTE — DISCHARGE SUMMARY
Delivery Discharge Summary  Obstetrics      Primary OB Clinician: Lori Sandoval MD      Admission date: 2025  Discharge date: 2025    Disposition: To home, self care    Discharge Diagnosis List:    Problem List[1]    Procedure: , due to maternal request    Hospital Course:  Margy Tai is a 34 y.o. now , POD #2 who was admitted on 2025 at 40w1d for IOL. Patient was subsequently admitted to labor and delivery unit with signed consents.     Labor course was complicated by maternal discomfort, and decision was made to proceed with delivery via  which was performed without complications.    Please see delivery note for further details. Her postpartum course was complicated by ABLA for which she received iron/colace and gHTN that did not require medication management.  On discharge day, patient's pain is controlled with oral pain medications. Pt is tolerating ambulation without SOB or CP, and regular diet without N/V. Reports lochia is mild. Denies any HA, vision changes, F/C, LE swelling. Denies any breast pain/soreness.    Pt in stable condition and ready for discharge. She has been instructed to start and/or continue medications and follow up with her obstetrics provider as listed below.    Pertinent studies:  CBC  Recent Labs   Lab 25  0029 25  0338   WBC 8.56 10.36   HGB 10.9* 8.8*   HCT 32.2* 26.6*   MCV 87 88    188          Immunization History   Administered Date(s) Administered    Influenza - Trivalent - Fluarix, Flulaval, Fluzone, Afluria - PF 2024    Rsv, Bivalent, Rsvpref (Abrysvo) 2025    Tdap 2024        Delivery:    Episiotomy:     Lacerations:     Repair suture:     Repair # of packets:     Blood loss (ml):       Birth information:  YOB: 2024   Time of birth: 3:50 AM   Sex: female   Delivery type: , Low Transverse   Gestational Age: 40w1d     Measurements    Weight: 3500 g  Weight (lbs): 7 lb 11.5  "oz  Length: 49.5 cm  Length (in): 19.5"  Head circumference: 34.9 cm  Chest circumference: 34.3 cm         Delivery Clinician: Delivery Providers    Delivering clinician: Pattie Lipscomb MD   Provider Role    Nicole Clark RN Cunningham, Claire, JANETH Perez, Francy, Meera Hickman ST Singer, Emily, MD Williams, MD Nay              Additional  information:  Forceps:    Vacuum:    Breech:    Observed anomalies      Living?:     Apgars    Living status: Living  Apgar Component Scores:  1 min.:  5 min.:  10 min.:  15 min.:  20 min.:    Skin color:  0  1       Heart rate:  2  2       Reflex irritability:  2  2       Muscle tone:  2  2       Respiratory effort:  2  2       Total:  8  9       Apgars assigned by: MARYLOU PEREZ RN         Placenta: Delivered:       appearance    Patient Instructions:   Current Discharge Medication List        START taking these medications    Details   acetaminophen (TYLENOL) 325 MG tablet Take 2 tablets (650 mg total) by mouth every 6 (six) hours. Alternate between ibuprofen and tylenol every 3 hours. For example: @0800: ibuprofen 600mg @1100: tylenol 650mg @1400: ibuprofen 600mg @1700: tylenol 650 mg @2000: ibuprofen 600mg  Qty: 60 tablet, Refills: 0      docusate sodium (COLACE) 100 MG capsule Take 2 capsules (200 mg total) by mouth 2 (two) times daily.  Qty: 120 capsule, Refills: 0      ibuprofen (ADVIL,MOTRIN) 600 MG tablet Take 1 tablet (600 mg total) by mouth every 6 (six) hours. Alternate between ibuprofen and tylenol every 3 hours. For example: @0800: ibuprofen 600mg @1100: tylenol 650mg @1400: ibuprofen 600mg @1700: tylenol 650 mg @2000: ibuprofen 600mg  Qty: 60 tablet, Refills: 0      LIDOcaine (LIDODERM) 5 % Place 1 patch onto the skin once daily. Remove & Discard patch within 12 hours or as directed by MD  Qty: 3 patch, Refills: 0      oxyCODONE (ROXICODONE) 5 MG immediate release tablet Take 1 tablet (5 mg total) by mouth every 4 (four) hours as " needed for Pain.  Qty: 20 tablet, Refills: 0    Comments: Quantity prescribed more than 7 day supply? No  Associated Diagnoses: Status post primary low transverse  section           CONTINUE these medications which have NOT CHANGED    Details   albuterol (VENTOLIN HFA) 90 mcg/actuation inhaler Inhale 2 puffs into the lungs every 6 (six) hours as needed for Wheezing. Rescue  Qty: 18 g, Refills: 3      famotidine (PEPCID) 20 MG tablet Take 1 tablet (20 mg total) by mouth daily as needed for Heartburn.  Qty: 30 tablet, Refills: 11    Associated Diagnoses: Heartburn during pregnancy in second trimester      prenatal vit no.124/iron/folic (PRENATAL VITAMIN ORAL) Take 1 Dose by mouth Daily.      QUEtiapine (SEROQUEL) 50 MG tablet Take by mouth.           STOP taking these medications       albuterol (PROVENTIL) 2.5 mg /3 mL (0.083 %) nebulizer solution Comments:   Reason for Stopping:         ferrous sulfate (FEOSOL) 325 mg (65 mg iron) Tab tablet Comments:   Reason for Stopping:         fluticasone propionate (FLOVENT HFA) 44 mcg/actuation inhaler Comments:   Reason for Stopping:         ondansetron (ZOFRAN-ODT) 8 MG TbDL Comments:   Reason for Stopping:               Discharge Procedure Orders   Diet Adult Regular     Diet Adult Regular     Lifting restrictions   Order Comments: No lifting more than 15 pounds for 6 weeks     No driving until:   Order Comments: - Not taking narcotic pain medications  - You feel that you can safely slam on the brakes     Pelvic Rest   Order Comments: Nothing in vagina until evaluation at postpartum visit (no sex, tampons, or douching)     No dressing needed     Notify your health care provider if you experience any of the following:  temperature >100.4     Notify your health care provider if you experience any of the following:  persistent nausea and vomiting or diarrhea     Notify your health care provider if you experience any of the following:  severe uncontrolled pain      Notify your health care provider if you experience any of the following:  redness, tenderness, or signs of infection (pain, swelling, redness, odor or green/yellow discharge around incision site)     Notify your health care provider if you experience any of the following:  difficulty breathing or increased cough     Notify your health care provider if you experience any of the following:  severe persistent headache     Notify your health care provider if you experience any of the following:  worsening rash     Notify your health care provider if you experience any of the following:  persistent dizziness, light-headedness, or visual disturbances     Notify your health care provider if you experience any of the following:  increased confusion or weakness     Notify your health care provider if you experience any of the following:   Order Comments: - Heavy vaginal bleeding soaking through more than 2 pads/hour for > 2 hours  - Severe abdominal pain  - Drainage from your incision  - Headache not relieved with Tylenol  - Vision changes  - Chest pain or shortness or breath     Lifting restrictions   Order Comments: No lifting more than 15 pounds for 6 weeks     No driving until:   Order Comments: - Not taking narcotic pain medications  - You feel that you can safely slam on the brakes     Pelvic Rest   Order Comments: Nothing in vagina until evaluation at postpartum visit (no sex, tampons, or douching)     No dressing needed     Notify your health care provider if you experience any of the following:  temperature >100.4     Notify your health care provider if you experience any of the following:  persistent nausea and vomiting or diarrhea     Notify your health care provider if you experience any of the following:  severe uncontrolled pain     Notify your health care provider if you experience any of the following:  redness, tenderness, or signs of infection (pain, swelling, redness, odor or green/yellow discharge  around incision site)     Notify your health care provider if you experience any of the following:  difficulty breathing or increased cough     Notify your health care provider if you experience any of the following:  severe persistent headache     Notify your health care provider if you experience any of the following:  worsening rash     Notify your health care provider if you experience any of the following:  persistent dizziness, light-headedness, or visual disturbances     Notify your health care provider if you experience any of the following:  increased confusion or weakness     Notify your health care provider if you experience any of the following:   Order Comments: - Heavy vaginal bleeding soaking through more than 2 pads/hour for > 2 hours  - Severe abdominal pain  - Drainage from your incision  - Headache not relieved with Tylenol  - Vision changes  - Chest pain or shortness or breath     Activity as tolerated     Shower on day dressing removed (No bath)   Order Comments: Do not submerge your incision in a bathtub until evaluation at postpartum visit        Follow-up Information       Lori Sandoval MD Follow up in 2 week(s).    Specialty: Obstetrics and Gynecology  Why: 2 week post partum mood check  Contact information:  [address] [facility] [id number] [phone]               Lori Sandoval MD Follow up in 6 week(s).    Specialty: Obstetrics and Gynecology  Why: 6 week post partum visit  Contact information:  [address] [facility] [address] [phone]               Lori Sandoval MD Follow up in 1 week(s).    Specialty: Obstetrics and Gynecology  Why: 1 week blood pressure check  Contact information:  [address] [facility] [address]  [phone]                              Vania Johnston MD (Mary)   Obstetrics and Gynecology, PGY1         [1]   Patient Active Problem List  Diagnosis    Bipolar affective disorder in remission    Moderate asthma  without complication    Asthma during pregnancy    Encounter for induction of labor    Obesity affecting pregnancy in third trimester    Gestational hypertension, third trimester    s/p pLTCS    Acute blood loss anemia (ABLA)    Mental disorder affecting pregnancy in third trimester

## 2025-02-20 NOTE — LACTATION NOTE
LC to room. Patient feeding baby a bottle of formula at this time. Shonna states she latched baby all morning then gave bottle last 2 feedings. Discussed basic lactation education and encouraged to call for latch assistance for help with cross cradle on right breast, baby nurses in FB hold on Left per mother. Encouraged to call LC for latch help. LC number on board. Family walked in to visit.      02/19/25 2004   Maternal Infant Feeding   Latch Assistance no   Community Referrals   Community Referrals support group;pediatric care provider;outpatient lactation program

## 2025-02-20 NOTE — PROGRESS NOTES
Mother Baby Care Guide reviewed. Pt verbalized understanding that she will follow up with OB in two weeks for a mood check and then again in six weeks.

## 2025-02-20 NOTE — NURSING
D/C instructions given to patient regarding follow up appts, Rx prescribed by MD and S&S of preE and PPH. Pt stated understanding. Mom aware when to follow up with OB. Pt declining discharge teaching from lactation as she will not breastfeed when she goes home. Pt in no distress awaiting transport for d/c.

## 2025-02-21 ENCOUNTER — PATIENT MESSAGE (OUTPATIENT)
Dept: OBSTETRICS AND GYNECOLOGY | Facility: OTHER | Age: 35
End: 2025-02-21
Payer: MEDICAID

## 2025-02-26 NOTE — ANESTHESIA POSTPROCEDURE EVALUATION
Anesthesia Post Evaluation    Patient: Margy Tai    Procedure(s) Performed: Procedure(s) (LRB):   SECTION (N/A)    Final Anesthesia Type: epidural      Patient location during evaluation: floor  Patient participation: Yes- Able to Participate  Level of consciousness: awake and alert and oriented  Post-procedure vital signs: reviewed and stable  Pain management: adequate  Airway patency: patent  CLYDE mitigation strategies: Multimodal analgesia  PONV status at discharge: No PONV  Anesthetic complications: no      Cardiovascular status: hemodynamically stable and blood pressure returned to baseline  Respiratory status: unassisted  Hydration status: euvolemic  Follow-up not needed.              Vitals Value Taken Time   /79 25 11:53   Temp 36.8 °C (98.2 °F) 25 11:53   Pulse 91 25 11:53   Resp 18 25 11:53   SpO2 100 % 25 11:53         Event Time   Out of Recovery 2025 07:32:00         Pain/Alyssa Score: No data recorded

## 2025-03-03 ENCOUNTER — TELEPHONE (OUTPATIENT)
Dept: OBSTETRICS AND GYNECOLOGY | Facility: CLINIC | Age: 35
End: 2025-03-03
Payer: MEDICAID

## 2025-03-03 NOTE — TELEPHONE ENCOUNTER
Returned pt call, pt stated she doing well. Pt stated she had two appointment with her therapist. She's taking medication and feeling fine. Pt stated she don't think she needs a mood check scheduled with Dr Sandoval. Adv pt to let us know if she needed anything. Pt verbalized understanding.           no

## 2025-03-03 NOTE — TELEPHONE ENCOUNTER
----- Message from Awais sent at 2/28/2025 12:10 PM CST -----  Contact: [phone]  [email or web address] Patient is calling for Medical Advice regarding: Pt said she has questions and needs a call back How long has patient had these symptoms:Pharmacy name and phone#:Patient wants a call back or thru [facility]: call back Comments:Please advise patient replies from provider may take up to 48 hours.

## 2025-04-01 ENCOUNTER — POSTPARTUM VISIT (OUTPATIENT)
Dept: OBSTETRICS AND GYNECOLOGY | Facility: CLINIC | Age: 35
End: 2025-04-01
Payer: MEDICAID

## 2025-04-01 VITALS
DIASTOLIC BLOOD PRESSURE: 82 MMHG | SYSTOLIC BLOOD PRESSURE: 122 MMHG | WEIGHT: 241.75 LBS | BODY MASS INDEX: 42.82 KG/M2

## 2025-04-01 DIAGNOSIS — Z30.9 ENCOUNTER FOR CONTRACEPTIVE MANAGEMENT, UNSPECIFIED TYPE: ICD-10-CM

## 2025-04-01 PROCEDURE — 99999 PR PBB SHADOW E&M-EST. PATIENT-LVL III: CPT | Mod: PBBFAC,,, | Performed by: OBSTETRICS & GYNECOLOGY

## 2025-04-01 PROCEDURE — 99213 OFFICE O/P EST LOW 20 MIN: CPT | Mod: PBBFAC,PN | Performed by: OBSTETRICS & GYNECOLOGY

## 2025-04-01 RX ORDER — LACTIC ACID, L-, CITRIC ACID MONOHYDRATE, AND POTASSIUM BITARTRATE 90; 50; 20 MG/5G; MG/5G; MG/5G
1 GEL VAGINAL
Qty: 180 G | Refills: 11 | Status: SHIPPED | OUTPATIENT
Start: 2025-04-01

## 2025-04-01 NOTE — PROGRESS NOTES
CC: Post-partum follow-up    HPI:  Margy Tai is a 34 y.o. female  presents for post-partum visit s/p a 1LTCS.    Delivery Date: 25  Delivery MD: Deisi  Gender: female  Birth Weight: see delivery summary  Breast Feeding: no  Bipolar disorder well controlled on medications, follows with Psych closely.  Contraception: desires Mirena IUD    ROS:  GENERAL: No fever, chills, fatigability or weight loss.  VULVAR: No pain, no lesions and no itching.  VAGINAL: No relaxation, no itching, no discharge, no abnormal bleeding and no lesions.  ABDOMEN: No abdominal pain. Denies nausea. Denies vomiting. No diarrhea. No constipation  BREAST: Denies pain. No lumps. No discharge.  URINARY: No incontinence, no nocturia, no frequency and no dysuria.  CARDIOVASCULAR: No chest pain. No shortness of breath. No leg cramps.  NEUROLOGICAL: No headaches. No vision changes.    PHYSICAL EXAM:  /82   Wt 109.6 kg (241 lb 11.8 oz)   Breastfeeding Yes   BMI 42.82 kg/m²   ABDOMEN: incision healing well, no erythema, induration or drainage  PELVIC: deferred    Diagnosis:  1. Postpartum care and examination    2. Encounter for contraceptive management, unspecified type        Plan:   Doing well postpartum  Pap up to date  Mood stable - continue current medications and close follow up with Psych  Contraception - Mirena IUD scheduled. Rx phexxi.     Orders Placed This Encounter    Device Authorization Order    lactic acid-citric-potassium (PHEXXI) 1.8-1-0.4 % Gel         Patient was counseled today on A.C.S. Pap guidelines and recommendations for yearly pelvic exams and monthly self breast exams; to see her PCP for other health maintenance.    FOLLOW UP: for Mirena IUD

## 2025-04-08 ENCOUNTER — PROCEDURE VISIT (OUTPATIENT)
Dept: OBSTETRICS AND GYNECOLOGY | Facility: CLINIC | Age: 35
End: 2025-04-08
Payer: MEDICAID

## 2025-04-08 VITALS
DIASTOLIC BLOOD PRESSURE: 64 MMHG | SYSTOLIC BLOOD PRESSURE: 112 MMHG | BODY MASS INDEX: 43.11 KG/M2 | WEIGHT: 243.38 LBS

## 2025-04-08 DIAGNOSIS — Z30.430 ENCOUNTER FOR INITIAL INSERTION OF INTRAUTERINE CONTRACEPTIVE DEVICE: Primary | ICD-10-CM

## 2025-04-08 PROCEDURE — 99999PBSHW PR PBB SHADOW TECHNICAL ONLY FILED TO HB: Mod: PBBFAC,,,

## 2025-04-08 PROCEDURE — 99499 UNLISTED E&M SERVICE: CPT | Mod: S$PBB,,, | Performed by: OBSTETRICS & GYNECOLOGY

## 2025-04-08 PROCEDURE — 58300 INSERT INTRAUTERINE DEVICE: CPT | Mod: S$PBB,,, | Performed by: OBSTETRICS & GYNECOLOGY

## 2025-04-08 RX ADMIN — LEVONORGESTREL 1 INTRA UTERINE DEVICE: 52 INTRAUTERINE DEVICE INTRAUTERINE at 11:04

## 2025-04-08 NOTE — PROCEDURES
Insertion of IUD    Date/Time: 4/8/2025 11:30 AM    Performed by: Lori Sandoval MD  Authorized by: Lori Sandoval MD    Consent:     Consent obtained:  Prior to procedure the appropriate consent was completed and verified    Consent given by:  Patient    Procedure risks and benefits discussed: yes      Patient questions answered: yes      Patient agrees, verbalizes understanding, and wants to proceed: yes     Device to be inserted was verified by patient: yes    Educational handouts given: yes      Instructions and paperwork completed: yes    Insertion Procedure:   1 Intra Uterine Device levonorgestreL 52 mg       Pelvic exam performed: yes      Negative urine pregnancy test: yes      Cervix cleaned and prepped: yes      Speculum placed in vagina: yes      Tenaculum applied to cervix: yes      Uterus sounded: yes      Uterus sound depth (cm):  10    IUD inserted with no complications: yes      IUD type:  Mirena    Strings trimmed: yes    Post-procedure:     Patient tolerated procedure well: yes      Patient will follow up after next period: yes

## 2025-05-03 ENCOUNTER — PATIENT MESSAGE (OUTPATIENT)
Dept: OBSTETRICS AND GYNECOLOGY | Facility: CLINIC | Age: 35
End: 2025-05-03
Payer: MEDICAID